# Patient Record
Sex: MALE | Race: BLACK OR AFRICAN AMERICAN | NOT HISPANIC OR LATINO
[De-identification: names, ages, dates, MRNs, and addresses within clinical notes are randomized per-mention and may not be internally consistent; named-entity substitution may affect disease eponyms.]

---

## 2018-11-02 PROBLEM — Z00.00 ENCOUNTER FOR PREVENTIVE HEALTH EXAMINATION: Status: ACTIVE | Noted: 2018-11-02

## 2018-11-05 ENCOUNTER — APPOINTMENT (OUTPATIENT)
Dept: NEPHROLOGY | Facility: CLINIC | Age: 73
End: 2018-11-05
Payer: MEDICARE

## 2018-11-05 VITALS — DIASTOLIC BLOOD PRESSURE: 65 MMHG | RESPIRATION RATE: 14 BRPM | SYSTOLIC BLOOD PRESSURE: 128 MMHG | HEART RATE: 75 BPM

## 2018-11-05 DIAGNOSIS — Z87.891 PERSONAL HISTORY OF NICOTINE DEPENDENCE: ICD-10-CM

## 2018-11-05 DIAGNOSIS — Z83.3 FAMILY HISTORY OF DIABETES MELLITUS: ICD-10-CM

## 2018-11-05 PROCEDURE — 99204 OFFICE O/P NEW MOD 45 MIN: CPT

## 2018-11-05 RX ORDER — METOPROLOL SUCCINATE 50 MG/1
50 TABLET, EXTENDED RELEASE ORAL
Refills: 0 | Status: ACTIVE | COMMUNITY

## 2018-11-06 LAB
APPEARANCE: CLEAR
BACTERIA: NEGATIVE
BILIRUBIN URINE: NEGATIVE
BLOOD URINE: NEGATIVE
COLOR: YELLOW
CORE LAB FLUID CYTOLOGY: NORMAL
CREAT SPEC-SCNC: 103 MG/DL
CREAT SPEC-SCNC: 103 MG/DL
CREAT/PROT UR: 0 RATIO
GLUCOSE QUALITATIVE U: NEGATIVE MG/DL
HYALINE CASTS: 0 /LPF
KETONES URINE: NEGATIVE
LEUKOCYTE ESTERASE URINE: NEGATIVE
MICROALBUMIN 24H UR DL<=1MG/L-MCNC: <1.2 MG/DL
MICROALBUMIN/CREAT 24H UR-RTO: NORMAL
MICROSCOPIC-UA: NORMAL
NITRITE URINE: NEGATIVE
PH URINE: 5
PROT UR-MCNC: 5 MG/DL
PROTEIN URINE: NEGATIVE MG/DL
RED BLOOD CELLS URINE: 0 /HPF
SODIUM ?TM SUB UR QN: 67 MMOL/L
SPECIFIC GRAVITY URINE: 1.01
SQUAMOUS EPITHELIAL CELLS: 0 /HPF
UROBILINOGEN URINE: NEGATIVE MG/DL
UUN UR-MCNC: 318 MG/DL
WHITE BLOOD CELLS URINE: 0 /HPF

## 2018-12-05 LAB
ANION GAP SERPL CALC-SCNC: 10 MMOL/L
BUN SERPL-MCNC: 16 MG/DL
CALCIUM SERPL-MCNC: 10 MG/DL
CHLORIDE SERPL-SCNC: 107 MMOL/L
CO2 SERPL-SCNC: 26 MMOL/L
CREAT SERPL-MCNC: 1.55 MG/DL
GLUCOSE SERPL-MCNC: 109 MG/DL
POTASSIUM SERPL-SCNC: 5.3 MMOL/L
SODIUM SERPL-SCNC: 143 MMOL/L

## 2019-02-11 ENCOUNTER — APPOINTMENT (OUTPATIENT)
Dept: NEPHROLOGY | Facility: CLINIC | Age: 74
End: 2019-02-11
Payer: MEDICARE

## 2019-02-11 VITALS — HEART RATE: 70 BPM | SYSTOLIC BLOOD PRESSURE: 128 MMHG | RESPIRATION RATE: 14 BRPM | DIASTOLIC BLOOD PRESSURE: 70 MMHG

## 2019-02-11 PROCEDURE — 99214 OFFICE O/P EST MOD 30 MIN: CPT

## 2019-02-11 NOTE — PHYSICAL EXAM
[General Appearance - Alert] : alert [General Appearance - In No Acute Distress] : in no acute distress [General Appearance - Well Developed] : well developed [Sclera] : the sclera and conjunctiva were normal [PERRL With Normal Accommodation] : pupils were equal in size, round, and reactive to light [Extraocular Movements] : extraocular movements were intact [Jugular Venous Distention Increased] : there was no jugular-venous distention [Auscultation Breath Sounds / Voice Sounds] : lungs were clear to auscultation bilaterally [Heart Rate And Rhythm] : heart rate was normal and rhythm regular [Heart Sounds] : normal S1 and S2 [Heart Sounds Gallop] : no gallops [Murmurs] : no murmurs [Heart Sounds Pericardial Friction Rub] : no pericardial rub [Full Pulse] : the pedal pulses are present [Edema] : there was no peripheral edema [Bowel Sounds] : normal bowel sounds [Abdomen Soft] : soft [Abdomen Tenderness] : non-tender [Abdomen Mass (___ Cm)] : no abdominal mass palpated [Cervical Lymph Nodes Enlarged Posterior Bilaterally] : posterior cervical [Cervical Lymph Nodes Enlarged Anterior Bilaterally] : anterior cervical [Supraclavicular Lymph Nodes Enlarged Bilaterally] : supraclavicular [No CVA Tenderness] : no ~M costovertebral angle tenderness [No Spinal Tenderness] : no spinal tenderness [Abnormal Walk] : normal gait [Musculoskeletal - Swelling] : no joint swelling seen [Skin Color & Pigmentation] : normal skin color and pigmentation [Skin Turgor] : normal skin turgor [] : no rash [Cranial Nerves] : cranial nerves 2-12 were intact [No Focal Deficits] : no focal deficits [Oriented To Time, Place, And Person] : oriented to person, place, and time [Impaired Insight] : insight and judgment were intact [Affect] : the affect was normal

## 2019-02-11 NOTE — HISTORY OF PRESENT ILLNESS
[FreeTextEntry1] : 72yo AA M with prediabetes, long standing well controlled HTN, newly dx L 2cm solid L renal mass under surveillance here for f/u of CKD III: \par \par PCP Dr. Milton Luciano\par Oncologist Dr. Taras Austin\par \par Feeling quite well, enjoyed holidays. No major health events or medication changes since visit. \par Not checking blood pressure at home. Gets mild ALEX at 4-5 flights, similar to prior, states it's been like this since his lobectomy. No chest pressure/orthopnea. Gets occasional mild headache self resolved.No dizziness. No LE swelling. \par Good appetite. Gained 10lbs over holidays. \par Recent 5 yr lung cancer f/u with oncologist, told everything was good, to return for f/u in 1 yr. \par No urinary sx. \par \par Reviewed OTC meds, prn allegra and rare tylenol

## 2019-02-11 NOTE — ASSESSMENT
[FreeTextEntry1] : 74yo AA M with prediabetes, long standing well controlled HTN, newly dx L 2cm solid L renal mass under surveillance here for f/u of CKD III: \par \par CKD III w L renal mass\par - reviewed Dec labs drawn by pcp, Cr remains at baseline Cr 1.3-1.5 for the last 2 years. Minimal albuminuria.\par -july ct L solid renal lesion 2.1-2.5cm up from 1.4cm. Reviewed his 11/18 MRI report, L kidney 11.4cm R11.2cm w L lower pole 2.3cm solid mass similar to size compared to 7/18. He's seeing  Dr. Aydin Heard in aug for f.u. \par - cont enalapril 10mg daily, recheck ACR today, if proteinuria increases we will inc. enalapril back to 20mg daily\par - unclear etiology, likely from long standing HTN and age related changes\par \par HTN - well controlled, encouraged exercise, low Na diet\par \par RTC in 6 months for fu\par

## 2019-02-11 NOTE — REVIEW OF SYSTEMS
[As Noted in HPI] : as noted in HPI [SOB on Exertion] : shortness of breath during exertion [Negative] : Heme/Lymph

## 2019-02-13 LAB
APPEARANCE: CLEAR
BACTERIA: NEGATIVE
BILIRUBIN URINE: NEGATIVE
BLOOD URINE: NEGATIVE
COLOR: YELLOW
CREAT SPEC-SCNC: 73 MG/DL
CREAT SPEC-SCNC: 73 MG/DL
CREAT/PROT UR: 0.1 RATIO
GLUCOSE QUALITATIVE U: NEGATIVE MG/DL
HYALINE CASTS: 0 /LPF
KETONES URINE: NEGATIVE
LEUKOCYTE ESTERASE URINE: NEGATIVE
MICROALBUMIN 24H UR DL<=1MG/L-MCNC: <1.2 MG/DL
MICROALBUMIN/CREAT 24H UR-RTO: NORMAL
MICROSCOPIC-UA: NORMAL
NITRITE URINE: NEGATIVE
PH URINE: 5
PROT UR-MCNC: 5 MG/DL
PROTEIN URINE: NEGATIVE MG/DL
RED BLOOD CELLS URINE: 1 /HPF
SPECIFIC GRAVITY URINE: 1.01
SQUAMOUS EPITHELIAL CELLS: 0 /HPF
UROBILINOGEN URINE: NEGATIVE MG/DL
WHITE BLOOD CELLS URINE: 0 /HPF

## 2019-08-12 ENCOUNTER — APPOINTMENT (OUTPATIENT)
Dept: NEPHROLOGY | Facility: CLINIC | Age: 74
End: 2019-08-12
Payer: MEDICARE

## 2019-08-12 VITALS
HEIGHT: 74 IN | OXYGEN SATURATION: 98 % | HEART RATE: 72 BPM | DIASTOLIC BLOOD PRESSURE: 69 MMHG | WEIGHT: 175 LBS | SYSTOLIC BLOOD PRESSURE: 114 MMHG | BODY MASS INDEX: 22.46 KG/M2

## 2019-08-12 PROCEDURE — 99214 OFFICE O/P EST MOD 30 MIN: CPT

## 2019-08-12 NOTE — ASSESSMENT
[FreeTextEntry1] : 75yo AA M with prediabetes, long standing well controlled HTN, newly dx L 2cm solid L renal mass under surveillance here for f/u of CKD III: \par \par CKD III w L renal mass\par - reviewed  July labs, Cr stable 1.6, baseline 1.3-1.5, u/a bland. \par -july 2018 ct L solid renal lesion 2.1-2.5cm up from 1.4cm. Reviewed his 11/18 MRI report, L kidney 11.4cm R11.2cm w L lower pole 2.3cm solid mass similar to size compared to 7/18. He's seeing  Dr. Aydin Heard this week for f/u. Urine cytology w some atypia. \par - cont enalapril 10mg daily\par - unclear etiology, likely from long standing HTN and age related changes\par \par HTN - well controlled, encouraged exercise, low Na diet\par \par RTC in 6 months for fu\par

## 2019-08-12 NOTE — PHYSICAL EXAM
[General Appearance - In No Acute Distress] : in no acute distress [General Appearance - Alert] : alert [Sclera] : the sclera and conjunctiva were normal [PERRL With Normal Accommodation] : pupils were equal in size, round, and reactive to light [General Appearance - Well Developed] : well developed [Jugular Venous Distention Increased] : there was no jugular-venous distention [Extraocular Movements] : extraocular movements were intact [Auscultation Breath Sounds / Voice Sounds] : lungs were clear to auscultation bilaterally [Heart Sounds] : normal S1 and S2 [Heart Rate And Rhythm] : heart rate was normal and rhythm regular [Heart Sounds Gallop] : no gallops [Murmurs] : no murmurs [Full Pulse] : the pedal pulses are present [Heart Sounds Pericardial Friction Rub] : no pericardial rub [Bowel Sounds] : normal bowel sounds [Edema] : there was no peripheral edema [Abdomen Soft] : soft [Abdomen Tenderness] : non-tender [Cervical Lymph Nodes Enlarged Posterior Bilaterally] : posterior cervical [Abdomen Mass (___ Cm)] : no abdominal mass palpated [Supraclavicular Lymph Nodes Enlarged Bilaterally] : supraclavicular [Cervical Lymph Nodes Enlarged Anterior Bilaterally] : anterior cervical [No CVA Tenderness] : no ~M costovertebral angle tenderness [No Spinal Tenderness] : no spinal tenderness [Abnormal Walk] : normal gait [Musculoskeletal - Swelling] : no joint swelling seen [Skin Color & Pigmentation] : normal skin color and pigmentation [Skin Turgor] : normal skin turgor [] : no rash [No Focal Deficits] : no focal deficits [Cranial Nerves] : cranial nerves 2-12 were intact [Oriented To Time, Place, And Person] : oriented to person, place, and time [Impaired Insight] : insight and judgment were intact [Affect] : the affect was normal

## 2019-08-12 NOTE — REVIEW OF SYSTEMS
[As Noted in HPI] : as noted in HPI [Heartburn] : heartburn [Negative] : Heme/Lymph [SOB on Exertion] : no shortness of breath during exertion [FreeTextEntry6] : occasional ALEX [FreeTextEntry4] : dental work

## 2019-08-12 NOTE — HISTORY OF PRESENT ILLNESS
[FreeTextEntry1] : 75yo AA M with prediabetes, long standing well controlled HTN, newly dx L 2cm solid L renal mass under surveillance here for f/u of CKD III: \par \par PCP Dr. Milton Luciano\par Oncologist Dr. Taras Austin\par \par Had dental extractions done, took ibuprofen for a few days but it caused him heart burn. Currently on a course of amoxicillin for the dental work. \par Using tylenol now. \par Good energy, feeling well. \par Recent 5 yr lung cancer f/u with oncologist, told everything was good, to return for f/u in 1 yr. \par No urinary sx. No foamy/bubbles, no hematuria or dysuria. No hesitancy. \par \par Reviewed OTC meds, prn allegra and rare tylenol

## 2019-08-13 ENCOUNTER — NON-APPOINTMENT (OUTPATIENT)
Age: 74
End: 2019-08-13

## 2019-08-13 ENCOUNTER — APPOINTMENT (OUTPATIENT)
Dept: OPHTHALMOLOGY | Facility: CLINIC | Age: 74
End: 2019-08-13
Payer: MEDICARE

## 2019-08-13 PROCEDURE — 92250 FUNDUS PHOTOGRAPHY W/I&R: CPT

## 2019-08-13 PROCEDURE — 92020 GONIOSCOPY: CPT

## 2019-08-13 PROCEDURE — 76514 ECHO EXAM OF EYE THICKNESS: CPT

## 2019-08-13 PROCEDURE — 92083 EXTENDED VISUAL FIELD XM: CPT

## 2019-08-13 PROCEDURE — 92004 COMPRE OPH EXAM NEW PT 1/>: CPT

## 2019-11-12 ENCOUNTER — NON-APPOINTMENT (OUTPATIENT)
Age: 74
End: 2019-11-12

## 2019-11-12 ENCOUNTER — APPOINTMENT (OUTPATIENT)
Dept: OPHTHALMOLOGY | Facility: CLINIC | Age: 74
End: 2019-11-12
Payer: MEDICARE

## 2019-11-12 PROCEDURE — 92133 CPTRZD OPH DX IMG PST SGM ON: CPT

## 2019-11-12 PROCEDURE — 92014 COMPRE OPH EXAM EST PT 1/>: CPT

## 2019-11-12 PROCEDURE — 92083 EXTENDED VISUAL FIELD XM: CPT

## 2019-12-17 ENCOUNTER — NON-APPOINTMENT (OUTPATIENT)
Age: 74
End: 2019-12-17

## 2019-12-17 ENCOUNTER — APPOINTMENT (OUTPATIENT)
Dept: OPHTHALMOLOGY | Facility: CLINIC | Age: 74
End: 2019-12-17
Payer: MEDICARE

## 2019-12-17 PROCEDURE — 92014 COMPRE OPH EXAM EST PT 1/>: CPT

## 2020-01-09 ENCOUNTER — APPOINTMENT (OUTPATIENT)
Dept: OPHTHALMOLOGY | Facility: CLINIC | Age: 75
End: 2020-01-09

## 2020-01-09 ENCOUNTER — APPOINTMENT (OUTPATIENT)
Dept: OPHTHALMOLOGY | Facility: CLINIC | Age: 75
End: 2020-01-09
Payer: MEDICARE

## 2020-01-09 ENCOUNTER — NON-APPOINTMENT (OUTPATIENT)
Age: 75
End: 2020-01-09

## 2020-01-09 ENCOUNTER — OUTPATIENT (OUTPATIENT)
Dept: OUTPATIENT SERVICES | Facility: HOSPITAL | Age: 75
LOS: 1 days | End: 2020-01-09

## 2020-01-09 PROCEDURE — 65855 TRABECULOPLASTY LASER SURG: CPT | Mod: RT

## 2020-01-10 DIAGNOSIS — H40.1112 PRIMARY OPEN-ANGLE GLAUCOMA, RIGHT EYE, MODERATE STAGE: ICD-10-CM

## 2020-01-30 ENCOUNTER — APPOINTMENT (OUTPATIENT)
Dept: OPHTHALMOLOGY | Facility: CLINIC | Age: 75
End: 2020-01-30

## 2020-01-30 ENCOUNTER — NON-APPOINTMENT (OUTPATIENT)
Age: 75
End: 2020-01-30

## 2020-01-30 ENCOUNTER — OUTPATIENT (OUTPATIENT)
Dept: OUTPATIENT SERVICES | Facility: HOSPITAL | Age: 75
LOS: 1 days | End: 2020-01-30

## 2020-01-30 ENCOUNTER — TRANSCRIPTION ENCOUNTER (OUTPATIENT)
Age: 75
End: 2020-01-30

## 2020-01-30 ENCOUNTER — APPOINTMENT (OUTPATIENT)
Dept: OPHTHALMOLOGY | Facility: CLINIC | Age: 75
End: 2020-01-30
Payer: MEDICARE

## 2020-01-30 PROCEDURE — 65855 TRABECULOPLASTY LASER SURG: CPT | Mod: LT

## 2020-02-03 DIAGNOSIS — H40.1121 PRIMARY OPEN-ANGLE GLAUCOMA, LEFT EYE, MILD STAGE: ICD-10-CM

## 2020-02-11 ENCOUNTER — APPOINTMENT (OUTPATIENT)
Dept: NEPHROLOGY | Facility: CLINIC | Age: 75
End: 2020-02-11
Payer: MEDICARE

## 2020-02-11 VITALS — SYSTOLIC BLOOD PRESSURE: 128 MMHG | HEART RATE: 65 BPM | DIASTOLIC BLOOD PRESSURE: 70 MMHG | RESPIRATION RATE: 14 BRPM

## 2020-02-11 PROCEDURE — 99214 OFFICE O/P EST MOD 30 MIN: CPT

## 2020-02-11 NOTE — REVIEW OF SYSTEMS
[As Noted in HPI] : as noted in HPI [Heartburn] : heartburn [Negative] : Heme/Lymph [SOB on Exertion] : no shortness of breath during exertion [FreeTextEntry4] : dental work [FreeTextEntry6] : occasional ALEX

## 2020-02-11 NOTE — PHYSICAL EXAM
[General Appearance - Alert] : alert [General Appearance - In No Acute Distress] : in no acute distress [Sclera] : the sclera and conjunctiva were normal [General Appearance - Well Developed] : well developed [Extraocular Movements] : extraocular movements were intact [PERRL With Normal Accommodation] : pupils were equal in size, round, and reactive to light [Jugular Venous Distention Increased] : there was no jugular-venous distention [Heart Rate And Rhythm] : heart rate was normal and rhythm regular [Auscultation Breath Sounds / Voice Sounds] : lungs were clear to auscultation bilaterally [Heart Sounds] : normal S1 and S2 [Murmurs] : no murmurs [Heart Sounds Gallop] : no gallops [Heart Sounds Pericardial Friction Rub] : no pericardial rub [Full Pulse] : the pedal pulses are present [Edema] : there was no peripheral edema [Bowel Sounds] : normal bowel sounds [Abdomen Soft] : soft [Abdomen Tenderness] : non-tender [Abdomen Mass (___ Cm)] : no abdominal mass palpated [Cervical Lymph Nodes Enlarged Posterior Bilaterally] : posterior cervical [Cervical Lymph Nodes Enlarged Anterior Bilaterally] : anterior cervical [Supraclavicular Lymph Nodes Enlarged Bilaterally] : supraclavicular [No CVA Tenderness] : no ~M costovertebral angle tenderness [No Spinal Tenderness] : no spinal tenderness [Abnormal Walk] : normal gait [Musculoskeletal - Swelling] : no joint swelling seen [Skin Color & Pigmentation] : normal skin color and pigmentation [Skin Turgor] : normal skin turgor [] : no rash [No Focal Deficits] : no focal deficits [Cranial Nerves] : cranial nerves 2-12 were intact [Oriented To Time, Place, And Person] : oriented to person, place, and time [Affect] : the affect was normal [Impaired Insight] : insight and judgment were intact

## 2020-02-11 NOTE — ASSESSMENT
[FreeTextEntry1] : 75yo AA M with prediabetes, long standing well controlled HTN, newly dx L 2cm solid L renal mass under surveillance here for f/u of CKD III: \par \par CKD III w L renal mass\par - baseline Cr stable 1.6-1.7 reviewd Dec labs (1 value up to 1.9), no proteinuria, \par baseline Cr up from 2018-early2019 baseline of 1.3-1.5, u/a bland. \par - unclear etiology of CKD, likely from long standing HTN and age related changes\par -july 2018 ct L solid renal lesion 2.1-2.5cm up from 1.4cm. Reviewed his 11/18 MRI report, L kidney 11.4cm R11.2cm w L lower pole 2.3cm solid mass similar to size compared to 7/18. 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 Urine cytology w some atypia. \par - cont enalapril 10mg daily\par - acute cystitis associated with urinary frequency and gross hematuria klebsiella. Resistenat kd ampicillin bu susceptible to all other abx. Per pt failed first abx, but resolved with Cipro. Repeat urine cx reviewed Jan 16th was negative and his sx have resolved. This is his first UTI. \par \par HTN - well controlled, encouraged exercise, low Na diet\par \par RTC in 6 months for fu\par

## 2020-02-11 NOTE — HISTORY OF PRESENT ILLNESS
[FreeTextEntry1] : 75yo AA M with prediabetes, emphysema on prn albuterol, long standing well controlled HTN, newly dx L 2cm solid L renal mass under surveillance here for f/u of CKD III: \par \par PCP Dr. Milton Luciano\par Oncologist Dr. Taras Austin\par  Dr. 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 \par No unexpected weight loss, night sweats. Has a good appetite. \par No urinary hesitancy/dysuria/frequency. Did see gross hematuria about 1 month ago, associated with urinary frequency and dysuria, diagnosed with UTI and treated with abx but per pt reoccurred so given another course of abx he thinks cipro (doesn't remember first abx). \par Occ L sciatic pain. \par \par Reviewed OTC meds, prn allegra and rare tylenol. No longer using NSAIDs since last visit per my advice

## 2020-02-14 ENCOUNTER — APPOINTMENT (OUTPATIENT)
Dept: OPHTHALMOLOGY | Facility: CLINIC | Age: 75
End: 2020-02-14
Payer: MEDICARE

## 2020-02-14 ENCOUNTER — NON-APPOINTMENT (OUTPATIENT)
Age: 75
End: 2020-02-14

## 2020-02-14 PROCEDURE — 92014 COMPRE OPH EXAM EST PT 1/>: CPT

## 2020-06-16 ENCOUNTER — NON-APPOINTMENT (OUTPATIENT)
Age: 75
End: 2020-06-16

## 2020-06-16 ENCOUNTER — APPOINTMENT (OUTPATIENT)
Dept: OPHTHALMOLOGY | Facility: CLINIC | Age: 75
End: 2020-06-16
Payer: MEDICARE

## 2020-06-16 PROCEDURE — 92083 EXTENDED VISUAL FIELD XM: CPT

## 2020-06-16 PROCEDURE — 92133 CPTRZD OPH DX IMG PST SGM ON: CPT

## 2020-06-16 PROCEDURE — 92014 COMPRE OPH EXAM EST PT 1/>: CPT

## 2020-09-02 ENCOUNTER — APPOINTMENT (OUTPATIENT)
Dept: NEPHROLOGY | Facility: CLINIC | Age: 75
End: 2020-09-02
Payer: MEDICARE

## 2020-09-02 VITALS — SYSTOLIC BLOOD PRESSURE: 126 MMHG | HEART RATE: 73 BPM | RESPIRATION RATE: 14 BRPM | DIASTOLIC BLOOD PRESSURE: 77 MMHG

## 2020-09-02 DIAGNOSIS — R51 HEADACHE: ICD-10-CM

## 2020-09-02 DIAGNOSIS — N17.9 ACUTE KIDNEY FAILURE, UNSPECIFIED: ICD-10-CM

## 2020-09-02 PROCEDURE — 99214 OFFICE O/P EST MOD 30 MIN: CPT

## 2020-09-02 NOTE — ASSESSMENT
[FreeTextEntry1] : 74yo black M with prediabetes, emphysema on prn albuterol, long standing well controlled HTN, newly dx L 2cm solid L renal mass under surveillance here for f/u of CKD III: \par \par CKD III w L renal mass\par - baseline Cr stable 1.5-1.7, no proteinuria, bland u/a\par - unclear etiology of CKD, likely from long standing HTN and age related changes\par -july 2018 ct L solid renal lesion 2.1-2.5cm up from 1.4cm. Reviewed his 11/18 MRI report, L kidney 11.4cm R11.2cm w L lower pole 2.3cm solid mass similar to size compared to 7/18. 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 Urine cytology w some atypia. \par - cont enalapril 10mg daily\par \par HTN - well controlled, encouraged exercise, low Na diet\par \par RTC in 6 months for fu\par

## 2020-09-02 NOTE — HISTORY OF PRESENT ILLNESS
[FreeTextEntry1] : 74yo black M with prediabetes, emphysema on prn albuterol, long standing well controlled HTN, newly dx L 2cm solid L renal mass under surveillance here for f/u of CKD III: \par \par PCP Dr. Milton Luciano\par Oncologist Dr. Taras Austin\par  Dr. Aydin Heard\par \par Doing well, sheltering in place. Watching a lot of [employer] and listening to music. No change in weight. Not checking BPs at home\par Is noting mild headaches similar in character to his typically headaches that self resolve or go away with tylenol, but a bit more frequent, about twice a week now. No associated visual or neurological sx, no associated chest pressure/sob or dizziness. \par Had an asymptomatic UTI earlier in the year. Denies urinary sx now\par

## 2020-09-02 NOTE — PHYSICAL EXAM
[General Appearance - Well Developed] : well developed [General Appearance - Alert] : alert [General Appearance - In No Acute Distress] : in no acute distress [Sclera] : the sclera and conjunctiva were normal [Extraocular Movements] : extraocular movements were intact [PERRL With Normal Accommodation] : pupils were equal in size, round, and reactive to light [Jugular Venous Distention Increased] : there was no jugular-venous distention [Auscultation Breath Sounds / Voice Sounds] : lungs were clear to auscultation bilaterally [Heart Rate And Rhythm] : heart rate was normal and rhythm regular [Heart Sounds Gallop] : no gallops [Heart Sounds] : normal S1 and S2 [Murmurs] : no murmurs [Heart Sounds Pericardial Friction Rub] : no pericardial rub [Full Pulse] : the pedal pulses are present [Edema] : there was no peripheral edema [Abdomen Soft] : soft [Bowel Sounds] : normal bowel sounds [Abdomen Tenderness] : non-tender [Abdomen Mass (___ Cm)] : no abdominal mass palpated [Supraclavicular Lymph Nodes Enlarged Bilaterally] : supraclavicular [Cervical Lymph Nodes Enlarged Anterior Bilaterally] : anterior cervical [Cervical Lymph Nodes Enlarged Posterior Bilaterally] : posterior cervical [No Spinal Tenderness] : no spinal tenderness [No CVA Tenderness] : no ~M costovertebral angle tenderness [Skin Color & Pigmentation] : normal skin color and pigmentation [Abnormal Walk] : normal gait [Musculoskeletal - Swelling] : no joint swelling seen [] : no rash [Skin Turgor] : normal skin turgor [Cranial Nerves] : cranial nerves 2-12 were intact [No Focal Deficits] : no focal deficits [Impaired Insight] : insight and judgment were intact [Affect] : the affect was normal [Oriented To Time, Place, And Person] : oriented to person, place, and time

## 2020-10-16 ENCOUNTER — APPOINTMENT (OUTPATIENT)
Dept: OPHTHALMOLOGY | Facility: CLINIC | Age: 75
End: 2020-10-16
Payer: MEDICARE

## 2020-10-16 ENCOUNTER — NON-APPOINTMENT (OUTPATIENT)
Age: 75
End: 2020-10-16

## 2020-10-16 PROCEDURE — 92083 EXTENDED VISUAL FIELD XM: CPT

## 2020-10-16 PROCEDURE — 92133 CPTRZD OPH DX IMG PST SGM ON: CPT

## 2020-10-16 PROCEDURE — 92014 COMPRE OPH EXAM EST PT 1/>: CPT

## 2020-10-16 PROCEDURE — 92020 GONIOSCOPY: CPT

## 2020-10-16 PROCEDURE — 92012 INTRM OPH EXAM EST PATIENT: CPT

## 2021-02-16 ENCOUNTER — NON-APPOINTMENT (OUTPATIENT)
Age: 76
End: 2021-02-16

## 2021-02-16 ENCOUNTER — APPOINTMENT (OUTPATIENT)
Dept: OPHTHALMOLOGY | Facility: CLINIC | Age: 76
End: 2021-02-16
Payer: MEDICARE

## 2021-02-16 PROCEDURE — 92014 COMPRE OPH EXAM EST PT 1/>: CPT

## 2021-02-16 PROCEDURE — 99072 ADDL SUPL MATRL&STAF TM PHE: CPT

## 2021-02-16 PROCEDURE — 92133 CPTRZD OPH DX IMG PST SGM ON: CPT

## 2021-02-16 PROCEDURE — 92083 EXTENDED VISUAL FIELD XM: CPT

## 2021-04-15 LAB
24R-OH-CALCIDIOL SERPL-MCNC: 71.4 PG/ML
25(OH)D3 SERPL-MCNC: 43.8 NG/ML
ALBUMIN SERPL ELPH-MCNC: 4.3 G/DL
ANION GAP SERPL CALC-SCNC: 10 MMOL/L
APPEARANCE: CLEAR
BACTERIA: NEGATIVE
BASOPHILS # BLD AUTO: 0.05 K/UL
BASOPHILS NFR BLD AUTO: 1.1 %
BILIRUBIN URINE: NEGATIVE
BLOOD URINE: NEGATIVE
BUN SERPL-MCNC: 18 MG/DL
CALCIUM SERPL-MCNC: 10 MG/DL
CALCIUM SERPL-MCNC: 10 MG/DL
CHLORIDE SERPL-SCNC: 105 MMOL/L
CO2 SERPL-SCNC: 24 MMOL/L
COLOR: YELLOW
CREAT SERPL-MCNC: 1.59 MG/DL
CREAT SPEC-SCNC: 213 MG/DL
CREAT SPEC-SCNC: 213 MG/DL
CREAT/PROT UR: 0.1 RATIO
EOSINOPHIL # BLD AUTO: 0.15 K/UL
EOSINOPHIL NFR BLD AUTO: 3.4 %
FERRITIN SERPL-MCNC: 126 NG/ML
GLUCOSE QUALITATIVE U: NEGATIVE
GLUCOSE SERPL-MCNC: 105 MG/DL
HCT VFR BLD CALC: 46.3 %
HGB BLD-MCNC: 14.3 G/DL
HYALINE CASTS: 1 /LPF
IMM GRANULOCYTES NFR BLD AUTO: 0.2 %
IRON SATN MFR SERPL: 24 %
IRON SERPL-MCNC: 62 UG/DL
KETONES URINE: NEGATIVE
LEUKOCYTE ESTERASE URINE: NEGATIVE
LYMPHOCYTES # BLD AUTO: 1.98 K/UL
LYMPHOCYTES NFR BLD AUTO: 45.1 %
MAN DIFF?: NORMAL
MCHC RBC-ENTMCNC: 26 PG
MCHC RBC-ENTMCNC: 30.9 GM/DL
MCV RBC AUTO: 84.3 FL
MICROALBUMIN 24H UR DL<=1MG/L-MCNC: 2.1 MG/DL
MICROALBUMIN/CREAT 24H UR-RTO: 10 MG/G
MICROSCOPIC-UA: NORMAL
MONOCYTES # BLD AUTO: 0.46 K/UL
MONOCYTES NFR BLD AUTO: 10.5 %
NEUTROPHILS # BLD AUTO: 1.74 K/UL
NEUTROPHILS NFR BLD AUTO: 39.7 %
NITRITE URINE: NEGATIVE
PARATHYROID HORMONE INTACT: 49 PG/ML
PH URINE: 6
PHOSPHATE SERPL-MCNC: 3.4 MG/DL
PLATELET # BLD AUTO: 223 K/UL
POTASSIUM SERPL-SCNC: 4.4 MMOL/L
PROT UR-MCNC: 12 MG/DL
PROTEIN URINE: NEGATIVE
RBC # BLD: 5.49 M/UL
RBC # FLD: 14.2 %
RED BLOOD CELLS URINE: 4 /HPF
SODIUM SERPL-SCNC: 140 MMOL/L
SPECIFIC GRAVITY URINE: 1.02
SQUAMOUS EPITHELIAL CELLS: 0 /HPF
TIBC SERPL-MCNC: 263 UG/DL
UIBC SERPL-MCNC: 201 UG/DL
URATE SERPL-MCNC: 8.9 MG/DL
UROBILINOGEN URINE: NORMAL
WBC # FLD AUTO: 4.39 K/UL
WHITE BLOOD CELLS URINE: 1 /HPF

## 2021-05-05 ENCOUNTER — APPOINTMENT (OUTPATIENT)
Dept: NEPHROLOGY | Facility: CLINIC | Age: 76
End: 2021-05-05
Payer: MEDICARE

## 2021-05-05 VITALS — DIASTOLIC BLOOD PRESSURE: 76 MMHG | OXYGEN SATURATION: 99 % | HEART RATE: 64 BPM | SYSTOLIC BLOOD PRESSURE: 136 MMHG

## 2021-05-05 VITALS — DIASTOLIC BLOOD PRESSURE: 77 MMHG | SYSTOLIC BLOOD PRESSURE: 127 MMHG | HEART RATE: 67 BPM

## 2021-05-05 DIAGNOSIS — Z85.118 PERSONAL HISTORY OF OTHER MALIGNANT NEOPLASM OF BRONCHUS AND LUNG: ICD-10-CM

## 2021-05-05 PROCEDURE — 99214 OFFICE O/P EST MOD 30 MIN: CPT

## 2021-05-05 PROCEDURE — 99072 ADDL SUPL MATRL&STAF TM PHE: CPT

## 2021-05-05 RX ORDER — ALBUTEROL 90 MCG
90 AEROSOL (GRAM) INHALATION
Refills: 0 | Status: ACTIVE | COMMUNITY

## 2021-05-05 RX ORDER — BRIMONIDINE TARTRATE 2 MG/MG
0.2 SOLUTION/ DROPS OPHTHALMIC
Refills: 0 | Status: ACTIVE | COMMUNITY

## 2021-05-05 RX ORDER — LATANOPROST/PF 0.005 %
0.01 DROPS OPHTHALMIC (EYE)
Refills: 0 | Status: ACTIVE | COMMUNITY

## 2021-05-05 RX ORDER — MULTIVITAMIN
TABLET ORAL
Refills: 0 | Status: ACTIVE | COMMUNITY

## 2021-05-05 RX ORDER — DORZOLAMIDE HYDROCHLORIDE 20 MG/ML
2 SOLUTION OPHTHALMIC
Refills: 0 | Status: ACTIVE | COMMUNITY

## 2021-05-05 RX ORDER — FEXOFENADINE HYDROCHLORIDE 180 MG/1
180 TABLET, FILM COATED ORAL
Refills: 0 | Status: ACTIVE | COMMUNITY

## 2021-05-05 NOTE — PHYSICAL EXAM
[General Appearance - Alert] : alert [General Appearance - In No Acute Distress] : in no acute distress [General Appearance - Well Developed] : well developed [Sclera] : the sclera and conjunctiva were normal [PERRL With Normal Accommodation] : pupils were equal in size, round, and reactive to light [Extraocular Movements] : extraocular movements were intact [Jugular Venous Distention Increased] : there was no jugular-venous distention [Auscultation Breath Sounds / Voice Sounds] : lungs were clear to auscultation bilaterally [Heart Rate And Rhythm] : heart rate was normal and rhythm regular [Heart Sounds] : normal S1 and S2 [Heart Sounds Gallop] : no gallops [Murmurs] : no murmurs [Heart Sounds Pericardial Friction Rub] : no pericardial rub [Full Pulse] : the pedal pulses are present [Edema] : there was no peripheral edema [Abdomen Soft] : soft [Abdomen Tenderness] : non-tender [Cervical Lymph Nodes Enlarged Posterior Bilaterally] : posterior cervical [Cervical Lymph Nodes Enlarged Anterior Bilaterally] : anterior cervical [Supraclavicular Lymph Nodes Enlarged Bilaterally] : supraclavicular [No CVA Tenderness] : no ~M costovertebral angle tenderness [No Spinal Tenderness] : no spinal tenderness [Abnormal Walk] : normal gait [Musculoskeletal - Swelling] : no joint swelling seen [Skin Color & Pigmentation] : normal skin color and pigmentation [Skin Turgor] : normal skin turgor [] : no rash [Cranial Nerves] : cranial nerves 2-12 were intact [No Focal Deficits] : no focal deficits [Oriented To Time, Place, And Person] : oriented to person, place, and time [Impaired Insight] : insight and judgment were intact [Affect] : the affect was normal

## 2021-05-10 PROBLEM — Z85.118: Status: RESOLVED | Noted: 2021-05-05 | Resolved: 2021-05-10

## 2021-05-10 NOTE — REVIEW OF SYSTEMS
[Eyes Itch] : itching of the eyes [Negative] : Neurological [Heartburn] : no heartburn [FreeTextEntry4] : runny nose [de-identified] : Denies weight loss or night sweats

## 2021-05-10 NOTE — ASSESSMENT
[FreeTextEntry1] : 74yo M with prediabetes, emphysema, long standing well controlled HTN, newly dx L 2cm solid L renal mass under surveillance here for f/u of CKD III: \par \par CKD III w L renal mass eGFR ranging 41-48, baseline Cr stable 1.5-1.7, no proteinuria, bland u/a\par - unclear etiology of CKD, likely from long standing HTN and age related changes\par - reviewed April labs with patient, stable kidney function, bland u/a\par - HTN well controlled\par -july 2018 ct L solid renal lesion 2.1-2.5cm up from 1.4cm. Reviewed his 11/18 MRI report, L kidney 11.4cm R11.2cm w L lower pole 2.3cm solid mass similar to size compared to 7/18. 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 Stable on f/u. \par \par RTC in 1 year for fu\par

## 2021-05-10 NOTE — HISTORY OF PRESENT ILLNESS
[FreeTextEntry1] : 76yo M with prediabetes, emphysema, long standing well controlled HTN, newly dx L 2cm solid L renal mass under surveillance here for f/u of CKD III: \par \par PCP Dr. Milton Luciano\par Oncologist Dr. Taras Austin\par  Dr. Aydin Heard\par \par Going to see urologist- has q6mnth appointment, mass has not grown so is under observation. Has annual Onc appt in Feb.\par Avoids NSAIDs, uses tylenol PRN\par BP ranges 120/75 at home. Has good compliance and physician f/u.\par Exercise limited d/t sciatica- went to rehab for some time; not on any meds for pain\par

## 2021-06-18 ENCOUNTER — NON-APPOINTMENT (OUTPATIENT)
Age: 76
End: 2021-06-18

## 2021-06-18 ENCOUNTER — APPOINTMENT (OUTPATIENT)
Dept: OPHTHALMOLOGY | Facility: CLINIC | Age: 76
End: 2021-06-18
Payer: MEDICARE

## 2021-06-18 PROCEDURE — 92133 CPTRZD OPH DX IMG PST SGM ON: CPT

## 2021-06-18 PROCEDURE — 92014 COMPRE OPH EXAM EST PT 1/>: CPT

## 2021-07-01 ENCOUNTER — APPOINTMENT (OUTPATIENT)
Dept: OPHTHALMOLOGY | Facility: CLINIC | Age: 76
End: 2021-07-01

## 2021-07-01 ENCOUNTER — NON-APPOINTMENT (OUTPATIENT)
Age: 76
End: 2021-07-01

## 2021-07-01 ENCOUNTER — OUTPATIENT (OUTPATIENT)
Dept: OUTPATIENT SERVICES | Facility: HOSPITAL | Age: 76
LOS: 1 days | End: 2021-07-01
Payer: MEDICARE

## 2021-07-01 PROCEDURE — 65855 TRABECULOPLASTY LASER SURG: CPT | Mod: RT

## 2021-07-02 DIAGNOSIS — H40.1112 PRIMARY OPEN-ANGLE GLAUCOMA, RIGHT EYE, MODERATE STAGE: ICD-10-CM

## 2021-07-19 ENCOUNTER — APPOINTMENT (OUTPATIENT)
Dept: OPHTHALMOLOGY | Facility: CLINIC | Age: 76
End: 2021-07-19
Payer: MEDICARE

## 2021-07-19 ENCOUNTER — NON-APPOINTMENT (OUTPATIENT)
Age: 76
End: 2021-07-19

## 2021-07-19 PROCEDURE — 92012 INTRM OPH EXAM EST PATIENT: CPT

## 2021-08-23 ENCOUNTER — NON-APPOINTMENT (OUTPATIENT)
Age: 76
End: 2021-08-23

## 2021-08-23 ENCOUNTER — APPOINTMENT (OUTPATIENT)
Dept: OPHTHALMOLOGY | Facility: CLINIC | Age: 76
End: 2021-08-23
Payer: MEDICARE

## 2021-08-23 PROCEDURE — 92012 INTRM OPH EXAM EST PATIENT: CPT

## 2021-10-12 ENCOUNTER — TRANSCRIPTION ENCOUNTER (OUTPATIENT)
Age: 76
End: 2021-10-12

## 2021-10-13 ENCOUNTER — NON-APPOINTMENT (OUTPATIENT)
Age: 76
End: 2021-10-13

## 2021-10-13 ENCOUNTER — OUTPATIENT (OUTPATIENT)
Dept: OUTPATIENT SERVICES | Facility: HOSPITAL | Age: 76
LOS: 1 days | Discharge: HOME | End: 2021-10-13
Payer: MEDICARE

## 2021-10-13 ENCOUNTER — APPOINTMENT (OUTPATIENT)
Dept: OPHTHALMOLOGY | Facility: AMBULATORY SURGERY CENTER | Age: 76
End: 2021-10-13

## 2021-10-13 PROCEDURE — 66170 GLAUCOMA SURGERY: CPT | Mod: RT

## 2021-10-14 ENCOUNTER — NON-APPOINTMENT (OUTPATIENT)
Age: 76
End: 2021-10-14

## 2021-10-14 ENCOUNTER — APPOINTMENT (OUTPATIENT)
Dept: OPHTHALMOLOGY | Facility: CLINIC | Age: 76
End: 2021-10-14
Payer: MEDICARE

## 2021-10-14 PROCEDURE — 99024 POSTOP FOLLOW-UP VISIT: CPT

## 2021-10-22 ENCOUNTER — NON-APPOINTMENT (OUTPATIENT)
Age: 76
End: 2021-10-22

## 2021-10-22 ENCOUNTER — APPOINTMENT (OUTPATIENT)
Dept: OPHTHALMOLOGY | Facility: CLINIC | Age: 76
End: 2021-10-22
Payer: MEDICARE

## 2021-10-22 PROCEDURE — 99024 POSTOP FOLLOW-UP VISIT: CPT

## 2021-11-01 ENCOUNTER — NON-APPOINTMENT (OUTPATIENT)
Age: 76
End: 2021-11-01

## 2021-11-01 ENCOUNTER — APPOINTMENT (OUTPATIENT)
Dept: OPHTHALMOLOGY | Facility: CLINIC | Age: 76
End: 2021-11-01
Payer: MEDICARE

## 2021-11-01 PROCEDURE — 99024 POSTOP FOLLOW-UP VISIT: CPT

## 2021-11-15 ENCOUNTER — APPOINTMENT (OUTPATIENT)
Dept: OPHTHALMOLOGY | Facility: CLINIC | Age: 76
End: 2021-11-15
Payer: MEDICARE

## 2021-11-15 ENCOUNTER — NON-APPOINTMENT (OUTPATIENT)
Age: 76
End: 2021-11-15

## 2021-11-15 PROCEDURE — 99024 POSTOP FOLLOW-UP VISIT: CPT

## 2021-12-06 ENCOUNTER — NON-APPOINTMENT (OUTPATIENT)
Age: 76
End: 2021-12-06

## 2021-12-06 ENCOUNTER — APPOINTMENT (OUTPATIENT)
Dept: OPHTHALMOLOGY | Facility: CLINIC | Age: 76
End: 2021-12-06
Payer: MEDICARE

## 2021-12-06 PROCEDURE — 99024 POSTOP FOLLOW-UP VISIT: CPT

## 2021-12-13 ENCOUNTER — NON-APPOINTMENT (OUTPATIENT)
Age: 76
End: 2021-12-13

## 2021-12-13 ENCOUNTER — APPOINTMENT (OUTPATIENT)
Dept: OPHTHALMOLOGY | Facility: CLINIC | Age: 76
End: 2021-12-13
Payer: MEDICARE

## 2021-12-13 PROCEDURE — 68200 TREAT EYELID BY INJECTION: CPT | Mod: 58,RT

## 2021-12-27 ENCOUNTER — APPOINTMENT (OUTPATIENT)
Dept: OPHTHALMOLOGY | Facility: CLINIC | Age: 76
End: 2021-12-27
Payer: MEDICARE

## 2021-12-27 ENCOUNTER — NON-APPOINTMENT (OUTPATIENT)
Age: 76
End: 2021-12-27

## 2021-12-27 PROCEDURE — 68200 TREAT EYELID BY INJECTION: CPT | Mod: 58,RT

## 2022-01-18 ENCOUNTER — NON-APPOINTMENT (OUTPATIENT)
Age: 77
End: 2022-01-18

## 2022-01-18 ENCOUNTER — APPOINTMENT (OUTPATIENT)
Dept: OPHTHALMOLOGY | Facility: CLINIC | Age: 77
End: 2022-01-18
Payer: MEDICARE

## 2022-01-18 PROCEDURE — 92012 INTRM OPH EXAM EST PATIENT: CPT

## 2022-02-18 ENCOUNTER — NON-APPOINTMENT (OUTPATIENT)
Age: 77
End: 2022-02-18

## 2022-02-18 ENCOUNTER — APPOINTMENT (OUTPATIENT)
Dept: OPHTHALMOLOGY | Facility: CLINIC | Age: 77
End: 2022-02-18
Payer: MEDICARE

## 2022-02-18 PROCEDURE — 92014 COMPRE OPH EXAM EST PT 1/>: CPT

## 2022-02-18 PROCEDURE — 92134 CPTRZ OPH DX IMG PST SGM RTA: CPT

## 2022-05-06 ENCOUNTER — APPOINTMENT (OUTPATIENT)
Dept: NEPHROLOGY | Facility: CLINIC | Age: 77
End: 2022-05-06
Payer: MEDICARE

## 2022-05-06 VITALS
RESPIRATION RATE: 14 BRPM | SYSTOLIC BLOOD PRESSURE: 125 MMHG | OXYGEN SATURATION: 99 % | HEART RATE: 71 BPM | DIASTOLIC BLOOD PRESSURE: 72 MMHG

## 2022-05-06 DIAGNOSIS — E78.5 HYPERLIPIDEMIA, UNSPECIFIED: ICD-10-CM

## 2022-05-06 PROCEDURE — 99214 OFFICE O/P EST MOD 30 MIN: CPT

## 2022-05-06 RX ORDER — ATORVASTATIN CALCIUM 20 MG/1
20 TABLET, FILM COATED ORAL
Refills: 0 | Status: ACTIVE | COMMUNITY
Start: 2022-05-06

## 2022-05-06 RX ORDER — ENALAPRIL MALEATE 10 MG/1
10 TABLET ORAL
Qty: 90 | Refills: 3 | Status: DISCONTINUED | COMMUNITY
Start: 2018-11-05 | End: 2022-05-06

## 2022-05-06 RX ORDER — SIMVASTATIN 40 MG/1
40 TABLET, FILM COATED ORAL
Refills: 0 | Status: DISCONTINUED | COMMUNITY
End: 2022-05-06

## 2022-05-06 NOTE — REVIEW OF SYSTEMS
[Eyes Itch] : itching of the eyes [Negative] : Neurological [Heartburn] : no heartburn [FreeTextEntry4] : runny nose [de-identified] : Denies weight loss or night sweats

## 2022-05-06 NOTE — PHYSICAL EXAM
[General Appearance - Alert] : alert [General Appearance - In No Acute Distress] : in no acute distress [General Appearance - Well Developed] : well developed [Sclera] : the sclera and conjunctiva were normal [PERRL With Normal Accommodation] : pupils were equal in size, round, and reactive to light [Extraocular Movements] : extraocular movements were intact [Jugular Venous Distention Increased] : there was no jugular-venous distention [Auscultation Breath Sounds / Voice Sounds] : lungs were clear to auscultation bilaterally [Heart Rate And Rhythm] : heart rate was normal and rhythm regular [Heart Sounds] : normal S1 and S2 [Heart Sounds Gallop] : no gallops [Murmurs] : no murmurs [Heart Sounds Pericardial Friction Rub] : no pericardial rub [Full Pulse] : the pedal pulses are present [Edema] : there was no peripheral edema [Abnormal Walk] : normal gait [Musculoskeletal - Swelling] : no joint swelling seen [Skin Color & Pigmentation] : normal skin color and pigmentation [Skin Turgor] : normal skin turgor [] : no rash [Cranial Nerves] : cranial nerves 2-12 were intact [No Focal Deficits] : no focal deficits [Oriented To Time, Place, And Person] : oriented to person, place, and time [Impaired Insight] : insight and judgment were intact [Affect] : the affect was normal

## 2022-05-06 NOTE — HISTORY OF PRESENT ILLNESS
[FreeTextEntry1] : 77yo M with prediabetes, emphysema, long standing well controlled HTN, L 2cm solid L renal mass under surveillance here for f/u of CKD III: \par \par PCP Dr. Milton Luciano\par Oncologist Dr. Taras Austin\par  Dr. Aydin Heard\par Cardiologist: Dr. Aydin KING\par \par BP ranges 130/70s at home, occasional low 140 systolic. Occasional ALEX - stable. No LE edema or orthopnea. \par No urinary hesitancy/straining or hematuria. No weight loss\par s/p second booster\par

## 2022-05-06 NOTE — ASSESSMENT
[FreeTextEntry1] : 77yo M with prediabetes, emphysema, long standing well controlled HTN, L 2cm solid L renal mass under surveillance here for f/u of CKD III: \par \par CKD III w L renal mass eGFR ranging 41-48, baseline Cr stable 1.5-1.7, no proteinuria, bland u/a\par - unclear etiology of CKD, likely from long standing HTN and age related changes\par - reviewed his PCP's Feb/22 labs with patient, stable kidney function Cr 1.4, normal electrolytes, bland u/a without proteinuria. Elevated uric acid ~9, no h/o gout, likely due to diet and HCTZ, since asymptomatic no need for changing medications.\par - angioedema 2/2 enalapril\par - HTN well controlled\par -july 2018 ct L solid renal lesion 2.1-2.5cm up from 1.4cm. Reviewed his 11/18 MRI report, L kidney 11.4cm R11.2cm w L lower pole 2.3cm solid mass similar to size compared to 7/18. 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 Had f/u with  next week. \par \par RTC in 1 year for fu\par

## 2022-05-27 ENCOUNTER — APPOINTMENT (OUTPATIENT)
Dept: OPHTHALMOLOGY | Facility: CLINIC | Age: 77
End: 2022-05-27
Payer: MEDICARE

## 2022-05-27 ENCOUNTER — NON-APPOINTMENT (OUTPATIENT)
Age: 77
End: 2022-05-27

## 2022-05-27 PROCEDURE — 92014 COMPRE OPH EXAM EST PT 1/>: CPT

## 2022-05-27 PROCEDURE — 92134 CPTRZ OPH DX IMG PST SGM RTA: CPT

## 2022-08-25 ENCOUNTER — NON-APPOINTMENT (OUTPATIENT)
Age: 77
End: 2022-08-25

## 2022-08-26 ENCOUNTER — APPOINTMENT (OUTPATIENT)
Dept: OPHTHALMOLOGY | Facility: CLINIC | Age: 77
End: 2022-08-26

## 2022-08-26 ENCOUNTER — NON-APPOINTMENT (OUTPATIENT)
Age: 77
End: 2022-08-26

## 2022-08-26 PROCEDURE — 92133 CPTRZD OPH DX IMG PST SGM ON: CPT

## 2022-08-26 PROCEDURE — 92014 COMPRE OPH EXAM EST PT 1/>: CPT

## 2022-10-28 ENCOUNTER — NON-APPOINTMENT (OUTPATIENT)
Age: 77
End: 2022-10-28

## 2022-10-28 ENCOUNTER — APPOINTMENT (OUTPATIENT)
Dept: OPHTHALMOLOGY | Facility: CLINIC | Age: 77
End: 2022-10-28

## 2022-10-28 PROCEDURE — 92083 EXTENDED VISUAL FIELD XM: CPT

## 2022-10-28 PROCEDURE — 92014 COMPRE OPH EXAM EST PT 1/>: CPT

## 2022-11-30 ENCOUNTER — APPOINTMENT (OUTPATIENT)
Dept: NEPHROLOGY | Facility: CLINIC | Age: 77
End: 2022-11-30

## 2022-11-30 PROCEDURE — 99214 OFFICE O/P EST MOD 30 MIN: CPT

## 2022-11-30 RX ORDER — IBUPROFEN 800 MG/1
800 TABLET, FILM COATED ORAL
Qty: 21 | Refills: 0 | Status: DISCONTINUED | COMMUNITY
Start: 2022-05-14

## 2022-11-30 RX ORDER — CHLORHEXIDINE GLUCONATE, 0.12% ORAL RINSE 1.2 MG/ML
0.12 SOLUTION DENTAL
Qty: 473 | Refills: 0 | Status: DISCONTINUED | COMMUNITY
Start: 2022-08-27

## 2022-11-30 RX ORDER — AMOXICILLIN 500 MG/1
500 TABLET, FILM COATED ORAL
Qty: 15 | Refills: 0 | Status: DISCONTINUED | COMMUNITY
Start: 2022-08-27

## 2022-11-30 RX ORDER — PILOCARPINE HYDROCHLORIDE OPHTHALMIC SOLUTION 20 MG/ML
2 SOLUTION/ DROPS OPHTHALMIC
Qty: 15 | Refills: 0 | Status: ACTIVE | COMMUNITY
Start: 2022-08-26

## 2022-11-30 RX ORDER — AMLODIPINE BESYLATE 10 MG/1
10 TABLET ORAL
Qty: 90 | Refills: 0 | Status: DISCONTINUED | COMMUNITY
Start: 2022-10-14

## 2022-11-30 RX ORDER — HYDROCHLOROTHIAZIDE 25 MG/1
25 TABLET ORAL
Refills: 0 | Status: DISCONTINUED | COMMUNITY
End: 2022-11-30

## 2022-11-30 RX ORDER — INDOMETHACIN 50 MG/1
50 CAPSULE ORAL
Qty: 90 | Refills: 0 | Status: DISCONTINUED | COMMUNITY
Start: 2022-07-01

## 2022-11-30 RX ORDER — DICLOFENAC SODIUM 1% 10 MG/G
1 GEL TOPICAL
Qty: 60 | Refills: 0 | Status: DISCONTINUED | COMMUNITY
Start: 2022-05-31

## 2022-11-30 RX ORDER — AMLODIPINE BESYLATE 10 MG/1
10 TABLET ORAL DAILY
Qty: 90 | Refills: 3 | Status: ACTIVE | COMMUNITY
Start: 2022-05-06

## 2022-11-30 RX ORDER — COLCHICINE 0.6 MG/1
0.6 TABLET ORAL
Qty: 30 | Refills: 0 | Status: DISCONTINUED | COMMUNITY
Start: 2022-07-01

## 2022-12-06 NOTE — REVIEW OF SYSTEMS
[Eyes Itch] : itching of the eyes [Negative] : Neurological [Heartburn] : no heartburn [FreeTextEntry4] : runny nose [de-identified] : Denies weight loss or night sweats

## 2022-12-06 NOTE — ASSESSMENT
[FreeTextEntry1] : 76yo M with prediabetes, emphysema, Gout long standing well controlled HTN, L 2cm solid L renal mass under surveillance here for f/u of CKD III: \par \par CKD III w L renal mass eGFR ranging 41-48, baseline Cr stable 1.5-1.7, no proteinuria, bland u/a\par - unclear etiology of CKD, likely from long standing HTN and age related changes\par - reviewed his PCP's October 24 and November 10 and 15  labs with patient, stable kidney function Cr 1.4, normal electrolytes\par \par Hypertension\par -Home Blood pressure readings between 130-70\par -HTN well controlled on Amlodipine 10mg PO daily and Metoprolol 50mg PO daily\par -Blood Pressure in office today 120/70\par -Continue with current medications \par \par Renal Mass \par -july 2018 ct L solid renal lesion 2.1-2.5cm up from 1.4cm. Reviewed his 11/18 MRI report, L kidney 11.4cm R11.2cm w L lower pole 2.3cm solid mass similar to size compared to 7/18. 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 \par Has MRI scheduled for 12/2/2022 with and without contrast. Patient educated to stay hydrated before and after MRI \par \par Gout:\par On Allopurinol \par Uric Acid level at 5.6 - at goal\par \par RTC in 1 year for fu\par

## 2022-12-06 NOTE — HISTORY OF PRESENT ILLNESS
[FreeTextEntry1] : 78yo M with prediabetes, emphysema, long standing well controlled HTN, L 2cm solid L renal mass under surveillance here for f/u of CKD III: \par \par PCP Dr. Milton Luciano\par Oncologist Dr. Taras Austin\par  Dr. Aydin Heard\par Cardiologist: Dr. Aydin [facility]\par \par \par Patient states that in between the months of June and July he noticed that his  right big toe was in severe pain and had difficulty walking. Patient went to the podiatrist and was diagnosed with gout. He then started on Allopurinol and Colchicine. Patient then had another attack on his left small toes a week later. Patient states that he only took colchicine 2x and was told to stop by his PCP. Since then patient has been free from any further attacks. He states that he takes Allopurinol daily. \par \par Patient also states that he saw his urologist last week and has a referral for an MRI to follow up on the Left 2 cm solid renal mass to evaluate the growth of the mass. \par \par BP ranges 134/70s at home, continues to have occasional ALEX - stable. No LE edema or orthopnea. \par No urinary hesitancy/straining or hematuria. No weight loss\par

## 2023-01-27 ENCOUNTER — APPOINTMENT (OUTPATIENT)
Dept: OPHTHALMOLOGY | Facility: CLINIC | Age: 78
End: 2023-01-27
Payer: MEDICARE

## 2023-01-27 ENCOUNTER — NON-APPOINTMENT (OUTPATIENT)
Age: 78
End: 2023-01-27

## 2023-01-27 PROCEDURE — 92133 CPTRZD OPH DX IMG PST SGM ON: CPT

## 2023-01-27 PROCEDURE — 92012 INTRM OPH EXAM EST PATIENT: CPT

## 2023-01-27 PROCEDURE — 92014 COMPRE OPH EXAM EST PT 1/>: CPT

## 2023-05-21 ENCOUNTER — NON-APPOINTMENT (OUTPATIENT)
Age: 78
End: 2023-05-21

## 2023-05-26 ENCOUNTER — APPOINTMENT (OUTPATIENT)
Dept: OPHTHALMOLOGY | Facility: CLINIC | Age: 78
End: 2023-05-26
Payer: MEDICARE

## 2023-05-26 ENCOUNTER — NON-APPOINTMENT (OUTPATIENT)
Age: 78
End: 2023-05-26

## 2023-05-26 PROCEDURE — 92014 COMPRE OPH EXAM EST PT 1/>: CPT

## 2023-05-26 PROCEDURE — 92133 CPTRZD OPH DX IMG PST SGM ON: CPT

## 2023-06-02 ENCOUNTER — APPOINTMENT (OUTPATIENT)
Dept: NEPHROLOGY | Facility: CLINIC | Age: 78
End: 2023-06-02
Payer: MEDICARE

## 2023-06-02 VITALS
RESPIRATION RATE: 16 BRPM | HEART RATE: 84 BPM | OXYGEN SATURATION: 98 % | SYSTOLIC BLOOD PRESSURE: 135 MMHG | DIASTOLIC BLOOD PRESSURE: 68 MMHG

## 2023-06-02 PROCEDURE — 99213 OFFICE O/P EST LOW 20 MIN: CPT

## 2023-06-02 NOTE — HISTORY OF PRESENT ILLNESS
[FreeTextEntry1] : 76yo M with prediabetes, emphysema, long standing well controlled HTN, L 2cm solid L renal mass under surveillance here for f/u of CKD III: \par \par PCP Dr. Milton Luciano\par Oncologist Dr. Taras Austin\par  Dr. Aydin Heard\par Cardiologist: Dr. Aydin KING\par \par He's been well, stable weight, no new health issues. \par One gout attack since last visit that was relieved quickly with colchicine\par BP well controlled sys usually 130-140\par \par

## 2023-06-02 NOTE — ASSESSMENT
Palliative Care    Patient: Trupti Mittal MRN: [id number]  SSN: [id number]    YOB: 1962  Age: 62 y.o. Sex: female       Date of Request: 3/29/2022  Date of Consult:  3/29/2022  Reason for Consult:  goals of care  Requesting Physician: Dr Esthela Ahn      Assessment/Plan:     Principal Diagnosis:    Altered Mental Status R41.82    Additional Diagnoses:   · Debility, Unspecified  R53.81  · Fatigue, Lethargy  R53.83  · Frailty  R54  · Nausea/Vomiting  R11.2  · Encounter for Palliative Care  Z51.5    Palliative Performance Scale (PPS):       Medical Decision Making:   Reviewed and summarized notes from admission to present   Discussed case with appropriate providersVito Gupta RN  Reviewed laboratory and x-ray data from admission to present     Pt laying on her back with eyes open when I entered the room. She appeared distressed but did not respond to greeting. She began vomiting bright green vomitus, and appeared to aspirate. I immediately rolled her on her side, and called for help. She was able to spit out some of the vomitus. Wet cough noted after incident. Nursing staff was very responsive, and helped stabilize pt. Discussed with LAZARO Nieves with Pulmonology. She has spoken to pt's daughter and sister, explained pt condition, and pending tests. Pt has an Advance Directive on file, which names  Ashok Onofre as her HCPOA. Nancy Duran (pt's sister) is listed as first alternate. Of note, pt has been seen by [facility] at [facility], with the last visit being in 2016. She was discharged from the practice due to mismanagement of opioids. We will continue to follow for goals of care.          Will discuss findings with members of the interdisciplinary team.      Thank you for this referral.          .    Subjective:     History obtained from:  Care Provider and Chart    Chief Complaint: Endocarditis, CML  History of Present Illness:  Ms Marcia Hannon is a 63 yo female with PMH of CML, chronic pain, DVT, portal vein [FreeTextEntry1] : 76yo M with prediabetes, emphysema, Gout long standing well controlled HTN, L 2cm solid L renal mass under surveillance here for f/u of CKD III: \par \par CKD III w L renal mass eGFR ranging 41-48, baseline Cr stable 1.5-1.7, no proteinuria, bland u/a\par - unclear etiology of CKD, likely from long standing HTN and age related\par - reviewed his PCP's May labs with pt, Cr at baseline, bland u/a with no proteinuria, normal electrolytes\par \par Hypertension\par -HTN well controlled on Amlodipine 10mg PO daily and Metoprolol 50mg PO daily\par \par Renal Mass \par - follows with , per pcp note May 8, his repeat MRI 3/8/2023 shows the L renal mass is stable in size compared to last year and the year before, likely low grade RCC under q6 month MRI surveillance \par \par Gout:\par On Allopurinol \par Uric Acid level at goal\par \par RTC in 1 year for fu\par  thrombosis, ICH, and other conditions listed below, who presented to the ER from home on 3/18/2022 with c/o increasing dyspnea and generalized weakness for since her discharge from [facility] on 3/14/2022. She had been treated for PNA/CHF during that admission. Pt denied , urinary symptoms, cough. Work up in the ED revealed pulmonary vascular congestion on CXR, and pt was admitted for further management. She was diuresed. Echocardiogram on 3/24 showed a mitral valve vegetation. Blood cultures drawn the same day were negative. Her mental status has declined over the last few days. Neurology was consulted, and brain imaging has been ordered. She currently appears acutely ill. Advance Directive: Yes       Code Status:  Full Code            Health Care Power of : Yes - Copy of [address] on file.     Past Medical History:   Diagnosis Date    Acute blood loss anemia 4/6/2018    Anemia     C. difficile diarrhea 4/1/2015    Cerebral edema (HCC) 4/1/2015    Cerebral hemorrhage with cognitive deficits (HCC) 5/23/2016    Chronic migraine 9/22/2016    Chronic myelogenous leukemia ([facility]     [place] chromosome/ converted from polycythemia in 2009- to 2012 when she was dx w leukemia    Chronic pain     Coagulation disorder ([address]     \"clotting and Bleeding Problem\" dr Rigo Jang follows     DVT (deep venous thrombosis) ([facility] 10/26/2016    Right subclavian     E coli bacteremia 3/9/2022    Esophageal spasm     with banding     Esophageal varices (HCC)     grade 3    GI bleed 8/3/2016    H/O craniotomy     3-29-15.  due to blood clot - which caused a seizure  - then pt fell and hit     Hematemesis 8/20/2021    Ischemic colitis ([address] 2/16/2009    Left homonymous hemianopsia 3/30/2015    Leukocytosis 3/14/2015    Melena 8/20/2021    Migraine with aura and with status migrainosus, not intractable 9/22/2016    MRSA colonization 6/25/2012    Polycythemia vera(238.4)     JAK2 mutation  Portal hypertension (HCC)     with varices    Portal vein thrombosis 6/20/2012    Ct scan 6-21-2 Apparent occlusion of the portal vein. In addition, the splenic vein, and superior mesenteric vein are not definitely seen and are also likely  occluded. Splenomegaly, and extensive varices are seen as described above consistent with the portal vein occlusion 7-05-12 on arixtra HIT negative on repeat 7-27-12 re admitted Cirrhotic appearance of the liver. Mild to moderate ascites, as    Portal vein thrombosis 2/16/2009    Ct scan 6-21-2 Apparent occlusion of the portal vein. In addition, the splenic vein, and superior mesenteric vein are not definitely seen and are also likely  occluded. Splenomegaly, and extensive varices are seen as described above consistent with the portal vein occlusion 7-05-12 on arixtra HIT negative on repeat 7-27-12 re admitted Cirrhotic appearance of the liver. Mild to moderate ascites    Primary hypothyroidism     Pulmonary embolism ([address] 2006    not on coumadin anymore     Pulmonary embolus ([address] 4/5/2018    Last Assessment & Plan:  Formatting of this note might be different from the original. 4/5: Patient has history of pulmonary embolus as well as factor VIII deficiency on full dose Lovenox at home but is on hold for now    S/P exploratory laparotomy, 6/20/12 6/25/2012    Bowel resection:  336 cm of small bowel removed, approximately 9 feet and placement of feeding jejunostomy.      S/P small bowel resection     2012.  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    had bled into head- surgery    Stroke [facility])     pt had stroke like symptoms     Subdural hemorrhage following injury, no loss of consciousness ([facility] 2016    Thrombocytopenia, HIT negative 2012 platelets lower repeat HIT 12 platelets in 69,708'A    Thrombosis, portal vein     portal , spleenic and recently superior mesenteric    Transfusion history     many blood tranfusions - last 3--15 after brain surgery    Traumatic hemorrhage of right cerebrum ([facility] [address] [facility] 3/30/2015      Past Surgical History:   Procedure Laterality Date    HX APPENDECTOMY      with small bowel resection    HX BREAST BIOPSY Bilateral     Lt - ; Rt -     HX CHOLECYSTECTOMY      HX GI      liver biopsy    HX GI      bowel removed small - 9 feet     HX GYN       x 2    HX GYN      D&C following miscarriage    HX ORTHOPAEDIC Left 2008    torn labrum shoulder (screw in place)    NEUROLOGICAL PROCEDURE UNLISTED  2010    craniotomy to evacuate subdural hematoma following a fall from a seizure    [place] ABDOMEN SURGERY PROC UNLISTED      umbilical hernia repair    [place] ABDOMEN SURGERY PROC UNLISTED      9ft of small bowel excised then reconnected     Family History   Problem Relation Age of Onset    Diabetes Mother     Stroke Mother         after surgery    Cancer Father         brain    No Known Problems Sister     Other Sister         diverticulitis    Other Sister         diverticulitis    Cancer Sister         lyjmphoma    Breast Cancer Maternal Aunt 48    Thyroid Disease Paternal Grandmother       Social History     Tobacco Use    Smoking status: Former Smoker     Packs/day: 0.20     Years: 10.00     Pack years: 2.00     Types: Cigarettes     Quit date:      Years since quittin.2    Smokeless tobacco: Never Used   Substance Use Topics    Alcohol use: No     Prior to Admission medications    Medication Sig Start Date End Date Taking? Authorizing Provider   furosemide (LASIX) 20 mg tablet Take 1 Tablet by mouth daily for 30 days.  3/14/22 4/13/22 Yes Melanie Sesay MD oxyCODONE-acetaminophen (PERCOCET 10)  mg per tablet  3/11/22  Yes Provider, Historical   zolpidem (AMBIEN) 10 mg tablet Take 1 Tablet by mouth nightly as needed for Sleep. Max Daily Amount: 10 mg. 1/17/22  Yes Maria Eugenia Parker MD   diphenoxylate-atropine (LomotiL) 2.5-0.025 mg per tablet Take 1 Tablet by mouth four (4) times daily as needed for Diarrhea. Max Daily Amount: 4 Tablets. 1/11/22  Yes Maria Eugenia Parker MD   famotidine (PEPCID) 40 mg tablet Take 1 Tablet by mouth daily as needed for Heartburn. Take at least 4 hours before or after sprycel 12/13/21  Yes Jen Mcnulty NP   dasatinib (SpryceL) 80 mg tab Take 1 Tablet by mouth daily. 11/12/21  Yes Maria Eugenia Parker MD   enoxaparin (Lovenox) 60 mg/0.6 mL injection 50 mg by SubCUTAneous route every twelve (12) hours. 9/17/21  Yes Maria Eugenia Parker MD   b complex vitamins tablet 1 Tablet daily. Yes Provider, Historical   Comp Stocking,Knee,Regular,Sml misc 1 Each by Does Not Apply route daily. 8/5/21  Yes Herb Roy DO   ondansetron (ZOFRAN ODT) 8 mg disintegrating tablet Take 1 Tab by mouth every eight (8) hours as needed for Nausea or Vomiting. 4/23/21  Yes Taylor Davis NP   magnesium 250 mg tab Take 1 Tablet by mouth daily. Yes Provider, Historical   Synthroid 112 mcg tablet 1 tablet once a day with an extra 1/2 tablet on Sundays 3/29/21  Yes Abdulkadir Lopez MD   ruxolitinib 5 mg tab Take 1 Tab by mouth two (2) times a day. Patient taking differently: Take 5 mg by mouth two (2) times a day. Sometimes only takes at night due to N/V/D 3/10/21  Yes Maria Eugenia Parker MD   ondansetron (ZOFRAN ODT) 4 mg disintegrating tablet Take 1 Tab by mouth every eight (8) hours as needed for Nausea. 8/3/20  Yes Maria Eugenia Parker MD   calcium carbonate (CALTREX) 600 mg calcium (1,500 mg) tablet Take 600 mg by mouth nightly. Yes Provider, Historical   cholecalciferol (VITAMIN D3) 1,000 unit cap Take 1,000 Units by mouth nightly.    Yes Provider, Historical   lansoprazole (PREVACID) 30 mg capsule Take 30 mg by mouth daily. prn   Yes Provider, Historical       Allergies   Allergen Reactions    Latex Other (comments)     Testing for latex allergy was positive as a 2 (on a scale of 1 to 3).  Sulfa (Sulfonamide Antibiotics) Anaphylaxis    Tramadol Other (comments)     Top lip swelled    Augmentin [Amoxicillin-Pot Clavulanate] Rash    Codeine Other (comments)    Divalproex Sodium Hives    Morphine Nausea and Vomiting     Not a true allergy    Potassium Clavulanate Hives    Rizatriptan Rash    Tetanus Immune Globulin Other (comments)     Heat, bruising, and swelling at  Site of injection    Vancomycin Rash    Xanax [Alprazolam] Other (comments)     Hallucinations    Zonegran [Zonisamide] Rash        Review of Systems:  Review of systems not obtained due to patient factors- AMS      Objective:     Visit Vitals  BP (!) 97/50 (BP 1 Location: Left upper arm, BP Patient Position: Lying)   Pulse 72   Temp 97.4 °F (36.3 °C)   Resp 20   Wt 126 lb 8.7 oz (57.4 kg)   SpO2 96%   BMI 22.42 kg/m²        Physical Exam:    General:  Appears acutely ill, actively vomiting   Eyes:  Conjunctivae/corneas clear    Nose: Nares normal. Septum midline. Neck: Supple, symmetrical, trachea midline   Lungs:   Coarse bilaterally, labored   Heart:  Regular rate and rhythm, tachycardic    Abdomen:   Soft, distended   Extremities: Normal, atraumatic, no cyanosis or edema. Bilateral hand mitts   Skin: Skin color, texture, turgor normal.    Neurologic: Lethargic.  Did not answer questions   Psych: Lethargic      Assessment:     Hospital Problems  Date Reviewed: 3/25/2022          Codes Class Noted POA    * (Principal) Vegetative endocarditis of mitral valve ICD-10-CM: I33.0  ICD-9-CM: 421.0  3/24/2022 Yes        Body mass index (BMI) of 23.0 to 23.9 in adult (Chronic) ICD-10-CM: A76.74  ICD-9-CM: V85.1  8/26/2021 Yes        Sepsis ([facility] ICD-10-CM: A41.9  ICD-9-CM: 038.9, 995.91  3/29/2022 No Encephalopathy acute ICD-10-CM: G93.40  ICD-9-CM: 348.30  3/29/2022 No        Acute kidney failure ([address] ICD-10-CM: N17.9  ICD-9-CM: 584.9  3/29/2022 No        Hyperkalemia ICD-10-CM: E87.5  ICD-9-CM: 276.7  3/28/2022 No        Endocarditis of mitral valve ICD-10-CM: I05.9  ICD-9-CM: 394.9  3/25/2022 Yes        CML (chronic myelocytic leukemia) (HCC) (Chronic) ICD-10-CM: C92.10  ICD-9-CM: 205.10  3/19/2022 Yes        Acute diastolic (congestive) heart failure (HCC) ICD-10-CM: I50.31  ICD-9-CM: 428.31, 428.0  3/19/2022 Yes        Pleural effusion, bilateral ICD-10-CM: J90  ICD-9-CM: 511.9  3/11/2022 Yes        Hypoproteinemia ([address] ICD-10-CM: E77.8  ICD-9-CM: 273.8  3/11/2022 Yes        Acute respiratory failure with hypoxemia (HCC) ICD-10-CM: J96.01  ICD-9-CM: 518.81  3/9/2022 Yes        Thrombocytopenia ([address] ICD-10-CM: D69.6  ICD-9-CM: 287.5  10/15/2021 Yes        Leukocytosis ICD-10-CM: Z14.188  ICD-9-CM: 288.60  10/16/2020 Yes        Chronic myelogenous leukemia (HCC) (Chronic) ICD-10-CM: C92.10  ICD-9-CM: 205.10  3/30/2015 Yes        Acquired hypercoagulable state ([address] (Chronic) ICD-10-CM: K04.38  ICD-9-CM: 289.81  3/30/2015 Yes        AKOSUA (iron deficiency anemia) ICD-10-CM: D50.9  ICD-9-CM: 280.9  7/28/2012 Yes        Cirrhosis ([address] (Chronic) ICD-10-CM: K74.60  ICD-9-CM: 571.5  7/27/2012 Yes        Malignant ascites ICD-10-CM: R18.0  ICD-9-CM: 789.51  7/26/2012 Yes        Polycythemia vera ([facility] (Chronic) ICD-10-CM: D45  ICD-9-CM: 238.4  2/16/2009 Yes    Overview Addendum 8/26/2021  2:31 PM by Conchis Jolley MD     2/2008 by [email or web address] analysis however portal vein thrombosis  And and splenomegaly since 2004  Hydroxyurea for 6 months poor tolerance spleen did not shrink   Pegasys 90 mcg weekly 4-2009 till    Restarted 12-30-09 45 mcg q 2 weeks  2-2010 reduced to q month   Increased 45 mcg 2/month 4-2010  Held 8-2010 thrombocytopenia  12-29-12 restarted 45 mcg q 2 weeks  4-1-11 45 mcg q 3 weeks  4-22-11 45 mcg q 4 weeks  Uncertain dosing thereafter   Possibly q 3 weeks 10-15-11 and held and restarted on 4-1-12 6-12 ct scan Small bowel wall thickening suggesting an enteritis. In addition, there is well thickening of the right colon which can suggest an additional   colitis although this can also be seen with severe portal hypertension. Likely reactive edematous changes it scattered fluid collections are seen of   the small bowel mesentery. . Apparent occlusion of the portal vein. In addition, the splenic vein, and superior mesenteric vein are not definitely seen and are also likely occluded. Splenomegaly, and extensive varices are seen as described above consistent with the portal vein occlusion. Alok Stuart Heterogeneous enhancement of the liver and mild periportal edema. An acute hepatitis cannot be excluded.                     Signed By: Ulysses Corner, NP     March 29, 2022

## 2023-06-02 NOTE — PHYSICAL EXAM
[General Appearance - Alert] : alert [General Appearance - In No Acute Distress] : in no acute distress [General Appearance - Well Developed] : well developed [Sclera] : the sclera and conjunctiva were normal [PERRL With Normal Accommodation] : pupils were equal in size, round, and reactive to light [Jugular Venous Distention Increased] : there was no jugular-venous distention [Extraocular Movements] : extraocular movements were intact [Auscultation Breath Sounds / Voice Sounds] : lungs were clear to auscultation bilaterally [Heart Rate And Rhythm] : heart rate was normal and rhythm regular [Heart Sounds Gallop] : no gallops [Heart Sounds] : normal S1 and S2 [Murmurs] : no murmurs [Heart Sounds Pericardial Friction Rub] : no pericardial rub [Full Pulse] : the pedal pulses are present [Edema] : there was no peripheral edema [Abnormal Walk] : normal gait [Musculoskeletal - Swelling] : no joint swelling seen [Skin Color & Pigmentation] : normal skin color and pigmentation [Skin Turgor] : normal skin turgor [] : no rash [Cranial Nerves] : cranial nerves 2-12 were intact [No Focal Deficits] : no focal deficits [Oriented To Time, Place, And Person] : oriented to person, place, and time [Impaired Insight] : insight and judgment were intact [Affect] : the affect was normal

## 2023-06-02 NOTE — REVIEW OF SYSTEMS
[Eyes Itch] : itching of the eyes [Negative] : Neurological [Heartburn] : no heartburn [FreeTextEntry4] : seasonal allergies [de-identified] : Denies weight loss or night sweats

## 2023-06-19 ENCOUNTER — NON-APPOINTMENT (OUTPATIENT)
Age: 78
End: 2023-06-19

## 2023-06-19 ENCOUNTER — APPOINTMENT (OUTPATIENT)
Dept: OPHTHALMOLOGY | Facility: CLINIC | Age: 78
End: 2023-06-19
Payer: MEDICARE

## 2023-06-19 PROCEDURE — 92012 INTRM OPH EXAM EST PATIENT: CPT

## 2023-08-01 NOTE — ASU PATIENT PROFILE, ADULT - NSICDXPASTMEDICALHX_GEN_ALL_CORE_FT
PAST MEDICAL HISTORY:  Chronic obstructive pulmonary disease (COPD)     Glaucoma     HTN (hypertension)     Hyperlipidemia     Lung cancer     PVC (premature ventricular contraction)     PVD (peripheral vascular disease)

## 2023-08-01 NOTE — ASU PATIENT PROFILE, ADULT - NSICDXPASTSURGICALHX_GEN_ALL_CORE_FT
PAST SURGICAL HISTORY:  Glaucoma, right eye     History of lobectomy of lung left     PAST SURGICAL HISTORY:  Glaucoma, right eye     H/O hernia repair left inguinal    H/O left knee surgery Patella repair    History of lobectomy of lung left upper lobe

## 2023-08-01 NOTE — ASU PATIENT PROFILE, ADULT - NS TRANSFER PATIENT BELONGINGS
Jewelry/Money (specify)/Clothing wallet/Wrist Watch/Cell Phone/PDA (specify)/Jewelry/Money (specify)/Clothing

## 2023-08-01 NOTE — ASU PATIENT PROFILE, ADULT - VISION (WITH CORRECTIVE LENSES IF THE PATIENT USUALLY WEARS THEM):
Partially impaired: cannot see medication labels or newsprint, but can see obstacles in path, and the surrounding layout; can count fingers at arm's length readers/Partially impaired: cannot see medication labels or newsprint, but can see obstacles in path, and the surrounding layout; can count fingers at arm's length

## 2023-08-02 ENCOUNTER — APPOINTMENT (OUTPATIENT)
Dept: OPHTHALMOLOGY | Facility: AMBULATORY SURGERY CENTER | Age: 78
End: 2023-08-02

## 2023-08-02 ENCOUNTER — TRANSCRIPTION ENCOUNTER (OUTPATIENT)
Age: 78
End: 2023-08-02

## 2023-08-02 ENCOUNTER — OUTPATIENT (OUTPATIENT)
Dept: OUTPATIENT SERVICES | Facility: HOSPITAL | Age: 78
LOS: 1 days | Discharge: HOME | End: 2023-08-02
Payer: MEDICARE

## 2023-08-02 ENCOUNTER — NON-APPOINTMENT (OUTPATIENT)
Age: 78
End: 2023-08-02

## 2023-08-02 VITALS
DIASTOLIC BLOOD PRESSURE: 77 MMHG | TEMPERATURE: 98 F | OXYGEN SATURATION: 99 % | HEART RATE: 77 BPM | SYSTOLIC BLOOD PRESSURE: 147 MMHG | RESPIRATION RATE: 16 BRPM

## 2023-08-02 VITALS
HEART RATE: 65 BPM | SYSTOLIC BLOOD PRESSURE: 155 MMHG | RESPIRATION RATE: 15 BRPM | WEIGHT: 165.35 LBS | HEIGHT: 74 IN | OXYGEN SATURATION: 100 % | DIASTOLIC BLOOD PRESSURE: 73 MMHG | TEMPERATURE: 98 F

## 2023-08-02 DIAGNOSIS — Z98.890 OTHER SPECIFIED POSTPROCEDURAL STATES: Chronic | ICD-10-CM

## 2023-08-02 DIAGNOSIS — H40.9 UNSPECIFIED GLAUCOMA: Chronic | ICD-10-CM

## 2023-08-02 DIAGNOSIS — Z90.2 ACQUIRED ABSENCE OF LUNG [PART OF]: Chronic | ICD-10-CM

## 2023-08-02 PROCEDURE — 66180 AQUEOUS SHUNT EYE W/GRAFT: CPT | Mod: RT

## 2023-08-02 DEVICE — SHUNT GLAUCOMA BAERVELDT: Type: IMPLANTABLE DEVICE | Site: RIGHT (RIGHT EYE) | Status: FUNCTIONAL

## 2023-08-02 RX ORDER — BRIMONIDINE TARTRATE 2 MG/MG
1 SOLUTION/ DROPS OPHTHALMIC
Refills: 0 | DISCHARGE

## 2023-08-02 RX ORDER — AMLODIPINE BESYLATE 2.5 MG/1
1 TABLET ORAL
Refills: 0 | DISCHARGE

## 2023-08-02 RX ORDER — FEXOFENADINE HCL 30 MG
1 TABLET ORAL
Refills: 0 | DISCHARGE

## 2023-08-02 RX ORDER — PILOCARPINE HCL 4 %
2 DROPS OPHTHALMIC (EYE)
Refills: 0 | DISCHARGE

## 2023-08-02 RX ORDER — ALLOPURINOL 300 MG
1 TABLET ORAL
Refills: 0 | DISCHARGE

## 2023-08-02 RX ORDER — LATANOPROST 0.05 MG/ML
1 SOLUTION/ DROPS OPHTHALMIC; TOPICAL
Refills: 0 | DISCHARGE

## 2023-08-02 RX ORDER — ALBUTEROL 90 UG/1
2 AEROSOL, METERED ORAL
Refills: 0 | DISCHARGE

## 2023-08-02 RX ORDER — METOPROLOL TARTRATE 50 MG
1 TABLET ORAL
Refills: 0 | DISCHARGE

## 2023-08-02 RX ORDER — ATORVASTATIN CALCIUM 80 MG/1
1 TABLET, FILM COATED ORAL
Refills: 0 | DISCHARGE

## 2023-08-02 RX ORDER — OFLOXACIN 0.3 %
1 DROPS OPHTHALMIC (EYE)
Refills: 0 | Status: COMPLETED | OUTPATIENT
Start: 2023-08-02 | End: 2023-08-02

## 2023-08-02 RX ORDER — ACETAMINOPHEN 500 MG
650 TABLET ORAL ONCE
Refills: 0 | Status: COMPLETED | OUTPATIENT
Start: 2023-08-02 | End: 2023-08-02

## 2023-08-02 RX ORDER — ASPIRIN/CALCIUM CARB/MAGNESIUM 324 MG
1 TABLET ORAL
Refills: 0 | DISCHARGE

## 2023-08-02 RX ORDER — COLCHICINE 0.6 MG
1 TABLET ORAL
Refills: 0 | DISCHARGE

## 2023-08-02 RX ADMIN — Medication 1 DROP(S): at 06:10

## 2023-08-02 RX ADMIN — Medication 1 DROP(S): at 06:20

## 2023-08-02 RX ADMIN — Medication 650 MILLIGRAM(S): at 10:20

## 2023-08-02 RX ADMIN — Medication 650 MILLIGRAM(S): at 09:50

## 2023-08-02 RX ADMIN — Medication 1 DROP(S): at 06:15

## 2023-08-02 NOTE — ASU DISCHARGE PLAN (ADULT/PEDIATRIC) - NS MD DC FALL RISK RISK
For information on Fall & Injury Prevention, visit: [email or web address] OR  [email or web address] OR  https://www.cdc.gov/steadi/patient.html

## 2023-08-02 NOTE — OPERATIVE REPORT - OPERATIVE RPOSRT DETAILS
Patient Name: CECY OG    Medical Record Number: [id number]    DATE OF SURGERY: AUGUST 2, 2023    OPERATING SURGEON: FLAVIO PENG M.D.    ASSISTANT SURGEON: BRUNO BECERRIL D.O.    ANESTHESIA: MONITORED ANESTHESIA CARE AND SUBCONJUNCTIVAL INJECTION.    PREOPERATIVE DIAGNOSIS: GLAUCOMA, RIGHT EYE    POSTOPERATIVE DIAGNOSIS: GLAUCOMA, RIGHT EYE    OPERATIVE PROCEDURE: BAERVELDT GLAUCOMA IMPLANT, CORNEAL PATCH GRAFT, MITOMYCIN C, RIGHT EYE.    COMPLICATIONS: NONE.    SPECIMEN: NONE.    ESTIMATED BLOOD LOSS: <1 cc    PATIENT CONDITION: STABLE.    PROCEDURE:   Prior to the procedure, all risks, benefits and alternatives were discussed with the patient, including but not limited to infection, bleeding, retinal detachment, increase or decrease in intraocular pressure, corneal edema, corneal decompensation, ptosis, diplopia, loss of vision, no improvement of vision, need for second surgery, macular edema, intraocular inflammation, etc. All questions were answered and the patient wished to proceed with the surgery. Informed consent was obtained.    The patient was wheeled to the operating room and placed on the operating table in a supine position. Next, the right eye was prepped and draped in the usual sterile fashion for intraocular surgery. An eyelid speculum was placed into the right eye.    A 7-0 silk traction suture was placed through the cornea and the eye was rotated superiorly. Subconjunctival and subtenon lidocaine was then injected into the inferonasal quadrant. An inferonasal peritomy was then created with Belem scissors and clot forceps. The area was carefully dissected posteriorly. A Baerveldt glaucoma implant 350 was primed with balanced salt solution. One 3-0 Prolene suture and one 6-0 Prolene suture were inserted as rip cords into the proximal opening of the tube. The tube was ligated tightly together with the two rip cord sutures, using two 7-0 Vicryl sutures. Inferior and nasal rectus muscles were isolated using a muscle hook and a cotton-tipped applicator. The plate was then placed 8 mm posterior to the limbus and sutured down with two interrupted 8-0 Nylon sutures.    The tube was then trimmed to an appropriate length. Using a 23 gauge needle, a sclerostomy was created with an external opening at 2 mm posterior to the limbus. The tube was then placed into the anterior chamber through the sclerostomy. It was noted to be in a good position. The tube was then sutured to the sclera using three interrupted 9-0 Nylon sutures. A corneal patch graft was sutured over the tube with two interrupted 8-0 Vicryl sutures. Two fenestrations were made across the tube for intraocular pressure control during the early postoperative period. After appropriate conjunctivoplasty, the conjunctiva was then reapproximated with interrupted 8-0 Vicryl sutures. Mitomycin C 15 micrograms was injected into the Tenon’s capsule over the tube plate.    At the end of the procedure, the anterior chamber was well formed. The intraocular pressure was in the mid-teens by palpation. The tube was in a good position and the conjunctival wound was water tight. Cefazolin and dexamethasone were applied on the cornea and conjunctiva. The eyelid speculum was removed. Topical antibiotic and steroid ointment was placed onto the right eye, which was then patched and shielded. The patient was wheeled to the recovery room in a stable and excellent condition.

## 2023-08-03 ENCOUNTER — NON-APPOINTMENT (OUTPATIENT)
Age: 78
End: 2023-08-03

## 2023-08-03 ENCOUNTER — APPOINTMENT (OUTPATIENT)
Dept: OPHTHALMOLOGY | Facility: CLINIC | Age: 78
End: 2023-08-03
Payer: MEDICARE

## 2023-08-03 PROBLEM — H40.9 UNSPECIFIED GLAUCOMA: Chronic | Status: ACTIVE | Noted: 2023-08-01

## 2023-08-03 PROBLEM — I10 ESSENTIAL (PRIMARY) HYPERTENSION: Chronic | Status: ACTIVE | Noted: 2023-08-01

## 2023-08-03 PROBLEM — C34.90 MALIGNANT NEOPLASM OF UNSPECIFIED PART OF UNSPECIFIED BRONCHUS OR LUNG: Chronic | Status: ACTIVE | Noted: 2023-08-01

## 2023-08-03 PROBLEM — E78.5 HYPERLIPIDEMIA, UNSPECIFIED: Chronic | Status: ACTIVE | Noted: 2023-08-01

## 2023-08-03 PROCEDURE — 99024 POSTOP FOLLOW-UP VISIT: CPT

## 2023-08-24 ENCOUNTER — NON-APPOINTMENT (OUTPATIENT)
Age: 78
End: 2023-08-24

## 2023-08-24 ENCOUNTER — APPOINTMENT (OUTPATIENT)
Dept: OPHTHALMOLOGY | Facility: CLINIC | Age: 78
End: 2023-08-24
Payer: MEDICARE

## 2023-08-24 PROBLEM — I49.3 VENTRICULAR PREMATURE DEPOLARIZATION: Chronic | Status: ACTIVE | Noted: 2023-08-01

## 2023-08-24 PROBLEM — I73.9 PERIPHERAL VASCULAR DISEASE, UNSPECIFIED: Chronic | Status: ACTIVE | Noted: 2023-08-01

## 2023-08-24 PROBLEM — J44.9 CHRONIC OBSTRUCTIVE PULMONARY DISEASE, UNSPECIFIED: Chronic | Status: ACTIVE | Noted: 2023-08-01

## 2023-08-24 PROCEDURE — 99024 POSTOP FOLLOW-UP VISIT: CPT

## 2023-09-18 ENCOUNTER — APPOINTMENT (OUTPATIENT)
Dept: OPHTHALMOLOGY | Facility: CLINIC | Age: 78
End: 2023-09-18
Payer: MEDICARE

## 2023-09-18 ENCOUNTER — NON-APPOINTMENT (OUTPATIENT)
Age: 78
End: 2023-09-18

## 2023-09-18 PROCEDURE — 99024 POSTOP FOLLOW-UP VISIT: CPT

## 2023-11-20 ENCOUNTER — APPOINTMENT (OUTPATIENT)
Dept: OPHTHALMOLOGY | Facility: CLINIC | Age: 78
End: 2023-11-20

## 2024-03-01 ENCOUNTER — APPOINTMENT (OUTPATIENT)
Dept: OPHTHALMOLOGY | Facility: CLINIC | Age: 79
End: 2024-03-01
Payer: MEDICARE

## 2024-03-01 ENCOUNTER — NON-APPOINTMENT (OUTPATIENT)
Age: 79
End: 2024-03-01

## 2024-03-01 PROCEDURE — 92134 CPTRZ OPH DX IMG PST SGM RTA: CPT

## 2024-03-01 PROCEDURE — 92014 COMPRE OPH EXAM EST PT 1/>: CPT

## 2024-06-06 ENCOUNTER — APPOINTMENT (OUTPATIENT)
Dept: NEPHROLOGY | Facility: CLINIC | Age: 79
End: 2024-06-06
Payer: MEDICARE

## 2024-06-06 VITALS — DIASTOLIC BLOOD PRESSURE: 70 MMHG | SYSTOLIC BLOOD PRESSURE: 128 MMHG

## 2024-06-06 DIAGNOSIS — M10.9 GOUT, UNSPECIFIED: ICD-10-CM

## 2024-06-06 DIAGNOSIS — N28.89 OTHER SPECIFIED DISORDERS OF KIDNEY AND URETER: ICD-10-CM

## 2024-06-06 DIAGNOSIS — I42.5 OTHER RESTRICTIVE CARDIOMYOPATHY: ICD-10-CM

## 2024-06-06 DIAGNOSIS — E79.0 HYPERURICEMIA W/OUT SIGNS OF INFLAMMATORY ARTHRITIS AND TOPHACEOUS DISEASE: ICD-10-CM

## 2024-06-06 DIAGNOSIS — I10 ESSENTIAL (PRIMARY) HYPERTENSION: ICD-10-CM

## 2024-06-06 DIAGNOSIS — I73.9 PERIPHERAL VASCULAR DISEASE, UNSPECIFIED: ICD-10-CM

## 2024-06-06 DIAGNOSIS — I48.91 UNSPECIFIED ATRIAL FIBRILLATION: ICD-10-CM

## 2024-06-06 DIAGNOSIS — R80.9 PROTEINURIA, UNSPECIFIED: ICD-10-CM

## 2024-06-06 DIAGNOSIS — N18.30 CHRONIC KIDNEY DISEASE, STAGE 3 UNSPECIFIED: ICD-10-CM

## 2024-06-06 PROCEDURE — G2211 COMPLEX E/M VISIT ADD ON: CPT

## 2024-06-06 PROCEDURE — 99215 OFFICE O/P EST HI 40 MIN: CPT

## 2024-06-06 RX ORDER — COLCHICINE 0.6 MG/1
0.6 TABLET ORAL DAILY
Refills: 0 | Status: ACTIVE | COMMUNITY
Start: 2024-06-06

## 2024-06-06 RX ORDER — ALLOPURINOL 200 MG/1
200 TABLET ORAL DAILY
Refills: 0 | Status: ACTIVE | COMMUNITY
Start: 2022-08-17

## 2024-06-06 NOTE — REVIEW OF SYSTEMS
[Negative] : Heme/Lymph [Fever] : no fever [Recent Weight Gain (___ Lbs)] : no recent weight gain [Recent Weight Loss (___ Lbs)] : no recent weight loss [Chest Pain] : no chest pain [Shortness Of Breath] : no shortness of breath [SOB on Exertion] : no shortness of breath during exertion [Dysuria] : no dysuria [Nocturia] : no nocturia

## 2024-06-07 NOTE — HISTORY OF PRESENT ILLNESS
[FreeTextEntry1] : 76yo M with prediabetes, emphysema, long standing well controlled HTN, L 2cm solid L renal mass under surveillance here for f/u of CKD III: overall reports doing well. No issues with taking medications.  Labs reviewed, stable sCr, no changes in meds since last seen. Major change will be diet to prevent further worsening  PCP Dr. Milton Luciano  Oncologist Dr. Taras Austin-->Dr. Michael Heard  Cardiologist: Dr. Aydin Deutsch [facility]

## 2024-06-07 NOTE — ASSESSMENT
[FreeTextEntry1] : CKD (chronic kidney disease) stage 3, GFR 30-59 ml/min (585.3) (N18.30) Benign essential HTN (401.1) (I10) Atrial fibrillation, unspecified type (427.31) (I48.91) Cardiomyopathy, restrictive (425.4) (I42.5) Asymptomatic hyperuricemia (790.6) (E79.0) Non-nephrotic range proteinuria (791.0) (R80.9) Peripheral vascular disease (443.9) (I73.9) Renal mass (593.9) (N28.89)  76yo M with prediabetes, emphysema, Gout long standing well controlled HTN, L 2cm solid L renal mass under surveillance here for f/u of CKD III:    CKD III w L renal mass eGFR ranging 41-48, baseline Cr stable 1.5-1.7, no proteinuria, bland u/a  - unclear etiology of CKD, likely from long standing HTN and age related  - reviewed his PCP's May labs with pt, Cr at baseline, bland u/a with no proteinuria, normal electrolytes    Hypertension  -HTN well controlled on Amlodipine 10mg PO daily and Metoprolol 50mg PO daily  - c/w same regimen  Renal Mass  - follows with , per pcp note May 8, his repeat MRI 3/8/2023 shows the L renal mass is stable in size compared to last year and the year before, likely low grade RCC under q6 month MRI surveillance    Gout:  On Allopurinol  Uric Acid level at goal    RTC in 1 year for f/u .

## 2024-06-07 NOTE — HISTORY OF PRESENT ILLNESS
[FreeTextEntry1] : 78yo M with prediabetes, emphysema, long standing well controlled HTN, L 2cm solid L renal mass under surveillance here for f/u of CKD III: overall reports doing well. No issues with taking medications.  Labs reviewed, stable sCr, no changes in meds since last seen. Major change will be diet to prevent further worsening  PCP Dr. Milton Luciano  Oncologist Dr. Taras Austin-->Dr. Michael Heard  Cardiologist: Dr. Aydin Deutsch [facility]

## 2024-06-07 NOTE — PHYSICAL EXAM
[General Appearance - Alert] : alert [General Appearance - In No Acute Distress] : in no acute distress [Auscultation Breath Sounds / Voice Sounds] : lungs were clear to auscultation bilaterally [Heart Rate And Rhythm] : heart rate was normal and rhythm regular [Heart Sounds] : normal S1 and S2 [Murmurs] : no murmurs [Edema] : there was no peripheral edema [Sclera] : the sclera and conjunctiva were normal [PERRL With Normal Accommodation] : pupils were equal in size, round, and reactive to light [Extraocular Movements] : extraocular movements were intact [Outer Ear] : the ears and nose were normal in appearance [Oropharynx] : the oropharynx was normal [Neck Appearance] : the appearance of the neck was normal [Neck Cervical Mass (___cm)] : no neck mass was observed [Jugular Venous Distention Increased] : there was no jugular-venous distention [Respiration, Rhythm And Depth] : normal respiratory rhythm and effort [Exaggerated Use Of Accessory Muscles For Inspiration] : no accessory muscle use [Veins - Varicosity Changes] : there were no varicosital changes [Bowel Sounds] : normal bowel sounds [Abdomen Soft] : soft [Abdomen Tenderness] : non-tender [Abdomen Mass (___ Cm)] : no abdominal mass palpated [No CVA Tenderness] : no ~M costovertebral angle tenderness [No Spinal Tenderness] : no spinal tenderness [Abnormal Walk] : normal gait [Involuntary Movements] : no involuntary movements were seen [Skin Color & Pigmentation] : normal skin color and pigmentation [Skin Turgor] : normal skin turgor [] : no rash [Cranial Nerves] : cranial nerves 2-12 were intact [No Focal Deficits] : no focal deficits [Affect] : the affect was normal [Mood] : the mood was normal

## 2024-06-07 NOTE — ASSESSMENT
[FreeTextEntry1] : CKD (chronic kidney disease) stage 3, GFR 30-59 ml/min (585.3) (N18.30) Benign essential HTN (401.1) (I10) Atrial fibrillation, unspecified type (427.31) (I48.91) Cardiomyopathy, restrictive (425.4) (I42.5) Asymptomatic hyperuricemia (790.6) (E79.0) Non-nephrotic range proteinuria (791.0) (R80.9) Peripheral vascular disease (443.9) (I73.9) Renal mass (593.9) (N28.89)  78yo M with prediabetes, emphysema, Gout long standing well controlled HTN, L 2cm solid L renal mass under surveillance here for f/u of CKD III:    CKD III w L renal mass eGFR ranging 41-48, baseline Cr stable 1.5-1.7, no proteinuria, bland u/a  - unclear etiology of CKD, likely from long standing HTN and age related  - reviewed his PCP's May labs with pt, Cr at baseline, bland u/a with no proteinuria, normal electrolytes    Hypertension  -HTN well controlled on Amlodipine 10mg PO daily and Metoprolol 50mg PO daily  - c/w same regimen  Renal Mass  - follows with , per pcp note May 8, his repeat MRI 3/8/2023 shows the L renal mass is stable in size compared to last year and the year before, likely low grade RCC under q6 month MRI surveillance    Gout:  On Allopurinol  Uric Acid level at goal    RTC in 1 year for f/u .

## 2024-07-05 ENCOUNTER — NON-APPOINTMENT (OUTPATIENT)
Age: 79
End: 2024-07-05

## 2024-07-05 ENCOUNTER — APPOINTMENT (OUTPATIENT)
Dept: OPHTHALMOLOGY | Facility: CLINIC | Age: 79
End: 2024-07-05
Payer: MEDICARE

## 2024-07-05 PROCEDURE — 92136 OPHTHALMIC BIOMETRY: CPT

## 2024-07-05 PROCEDURE — 92012 INTRM OPH EXAM EST PATIENT: CPT

## 2024-07-05 PROCEDURE — 92133 CPTRZD OPH DX IMG PST SGM ON: CPT

## 2024-11-26 ENCOUNTER — NON-APPOINTMENT (OUTPATIENT)
Age: 79
End: 2024-11-26

## 2024-11-26 ENCOUNTER — APPOINTMENT (OUTPATIENT)
Dept: OPHTHALMOLOGY | Facility: CLINIC | Age: 79
End: 2024-11-26
Payer: MEDICARE

## 2024-11-26 PROCEDURE — 92133 CPTRZD OPH DX IMG PST SGM ON: CPT

## 2024-11-26 PROCEDURE — 92014 COMPRE OPH EXAM EST PT 1/>: CPT

## 2025-03-20 ENCOUNTER — NON-APPOINTMENT (OUTPATIENT)
Age: 80
End: 2025-03-20

## 2025-03-21 ENCOUNTER — NON-APPOINTMENT (OUTPATIENT)
Age: 80
End: 2025-03-21

## 2025-03-21 ENCOUNTER — APPOINTMENT (OUTPATIENT)
Dept: OPHTHALMOLOGY | Facility: CLINIC | Age: 80
End: 2025-03-21
Payer: MEDICARE

## 2025-03-21 PROCEDURE — 92133 CPTRZD OPH DX IMG PST SGM ON: CPT

## 2025-03-21 PROCEDURE — 92012 INTRM OPH EXAM EST PATIENT: CPT

## 2025-06-03 ENCOUNTER — NON-APPOINTMENT (OUTPATIENT)
Age: 80
End: 2025-06-03

## 2025-06-05 ENCOUNTER — APPOINTMENT (OUTPATIENT)
Dept: NEPHROLOGY | Facility: CLINIC | Age: 80
End: 2025-06-05
Payer: MEDICARE

## 2025-06-05 VITALS — RESPIRATION RATE: 14 BRPM | HEART RATE: 82 BPM | DIASTOLIC BLOOD PRESSURE: 68 MMHG | SYSTOLIC BLOOD PRESSURE: 118 MMHG

## 2025-06-05 DIAGNOSIS — R80.9 PROTEINURIA, UNSPECIFIED: ICD-10-CM

## 2025-06-05 DIAGNOSIS — E79.0 HYPERURICEMIA W/OUT SIGNS OF INFLAMMATORY ARTHRITIS AND TOPHACEOUS DISEASE: ICD-10-CM

## 2025-06-05 DIAGNOSIS — M10.9 GOUT, UNSPECIFIED: ICD-10-CM

## 2025-06-05 DIAGNOSIS — I42.5 OTHER RESTRICTIVE CARDIOMYOPATHY: ICD-10-CM

## 2025-06-05 DIAGNOSIS — I10 ESSENTIAL (PRIMARY) HYPERTENSION: ICD-10-CM

## 2025-06-05 DIAGNOSIS — N18.30 CHRONIC KIDNEY DISEASE, STAGE 3 UNSPECIFIED: ICD-10-CM

## 2025-06-05 DIAGNOSIS — I48.91 UNSPECIFIED ATRIAL FIBRILLATION: ICD-10-CM

## 2025-06-05 PROCEDURE — 99214 OFFICE O/P EST MOD 30 MIN: CPT

## 2025-06-05 PROCEDURE — G2211 COMPLEX E/M VISIT ADD ON: CPT

## 2025-07-25 ENCOUNTER — APPOINTMENT (OUTPATIENT)
Dept: OPHTHALMOLOGY | Facility: CLINIC | Age: 80
End: 2025-07-25
Payer: MEDICARE

## 2025-07-25 ENCOUNTER — NON-APPOINTMENT (OUTPATIENT)
Age: 80
End: 2025-07-25

## 2025-07-25 PROCEDURE — 92133 CPTRZD OPH DX IMG PST SGM ON: CPT

## 2025-07-25 PROCEDURE — 92014 COMPRE OPH EXAM EST PT 1/>: CPT

## (undated) DEVICE — SUT PROLENE 6-0 24" CC

## (undated) DEVICE — SYR LUER LOK 5CC

## (undated) DEVICE — CANNULA IRR ANT CHAMBER 30G

## (undated) DEVICE — ELCTR BIPOLAR CORD 12FT

## (undated) DEVICE — CATARACT KIT

## (undated) DEVICE — SUT PROLENE 3-0 36" RB-1

## (undated) DEVICE — SUT VICRYL 8-0 12" TG140-8 DA

## (undated) DEVICE — PACK ANTERIOR SEGMENT

## (undated) DEVICE — SUT ETHILON 9-0 6" VAS100-4

## (undated) DEVICE — ELCTR ERASER BI-P BVL 45DEG 18G

## (undated) DEVICE — SUT ETHILON 8-0 12" TG175-8

## (undated) DEVICE — CANNULA BD & CO SUBTENONS

## (undated) DEVICE — SUT VICRYL 7-0 18" TG160-8 DA

## (undated) DEVICE — PETRI DISH MED 3.5"

## (undated) DEVICE — SUT VICRYL 10-0 12" CS160-6 DA

## (undated) DEVICE — NDL FILTER TW NOKOR 19GA 1.5"

## (undated) DEVICE — SUT SILK 7-0 18" TG140-8

## (undated) DEVICE — SYR MERIT MEDALLION 1 ML (YELLOW)

## (undated) DEVICE — DRAPE MICROSCOPE KNOB COVER SMALL (2 PCS)

## (undated) DEVICE — Device

## (undated) DEVICE — APPLICATOR COTTON TIP 3" STERILE

## (undated) DEVICE — SPEAR CELLULOSE 40410

## (undated) DEVICE — NDL COUNTER DBL BLADEGUARD

## (undated) DEVICE — DRAPE MAYO STAND 23"

## (undated) DEVICE — NDL HYPO NONSAFE 30G X 0.5" (BEIGE)

## (undated) DEVICE — KNIFE ALCON I-KNIFE II STAB KNIFE STANDARD 3MM (GREEN)

## (undated) DEVICE — GLV 7.5 PROTEXIS (WHITE)